# Patient Record
Sex: FEMALE | Race: WHITE | NOT HISPANIC OR LATINO | Employment: OTHER | URBAN - METROPOLITAN AREA
[De-identification: names, ages, dates, MRNs, and addresses within clinical notes are randomized per-mention and may not be internally consistent; named-entity substitution may affect disease eponyms.]

---

## 2020-07-28 NOTE — PROGRESS NOTES
Subjective:   8/5/2020  9:41 AM  Primary care physician:No primary care provider on file.  Referring Provider: Jay King MD  Medical Oncologist: Jay King MD  Other: Krunal Robison MD    Chief Complaint:   Chief Complaint   Patient presents with   • New Patient     NP LIVER CA REF DR KING     Diagnosis:   1. Hepatocellular carcinoma (HCC)     2. Abnormal CT of the abdomen     3. Abdominal pain, unspecified abdominal location     4. Viral hepatitis A without hepatic coma         History of presenting illness:  Precious Bullock  is a pleasant 70 y.o. year old female who presented with what appears to be cirrhosis of unknown origin with a hepatoma in possibly segment 2 or 4A.  The patient has significant cirrhosis of the liver with an enlarged spleen.  She states that she was not a drinker nor does she have hepatitis C or B.  She does have a polycythemia vera and is being followed by Dr. King.  During a work-up for she was found to have cirrhosis and tumor in that region.  There is a questionable seeing subcentimeter lesion in segment 4A as well.  In any event, the patient was then referred in to see me.  Her liver function tests are not that bad and her INR is normal.  She appears to be a child's class a cirrhotic.  I do not have a platelet count recently but she denies any upper or lower GI bleeds.  She has been scheduled for endoscopy for possible varices over the next couple weeks.  She has an appointment to see Dr. King this Friday.  The patient denies being jaundiced.  She denies any weight loss or increase in abdominal girth.  Her CT scan from an outside facility dated 2/25/2020 reveals that there is no ascites and there is a approximately 2 cm mass in the region as described above.  Following the left portal pedicle it does not reach the area of this tumor so it may overlap between segment 2 and 4A.  The patient does have splenomegaly.  She does have some varices, but I suspect based on liver function tests  that her platelet count will be adequate.  She is here with her  to discuss next steps.  I have personally reviewed the imaging studies as well as all the notes from Dr. Matias.      No past medical history on file.  No past surgical history on file.  Allergies   Allergen Reactions   • Pcn [Penicillins] Swelling and Myalgia   • Sulfa Drugs Anaphylaxis and Swelling     Outpatient Encounter Medications as of 2020   Medication Sig Dispense Refill   • amLODIPine (NORVASC) 10 MG Tab      • omeprazole (PRILOSEC) 40 MG delayed-release capsule      • SM ASPIRIN ADULT LOW STRENGTH 81 MG EC tablet        No facility-administered encounter medications on file as of 2020.      Social History     Socioeconomic History   • Marital status: Not on file     Spouse name: Not on file   • Number of children: Not on file   • Years of education: Not on file   • Highest education level: Not on file   Occupational History   • Not on file   Social Needs   • Financial resource strain: Not on file   • Food insecurity     Worry: Not on file     Inability: Not on file   • Transportation needs     Medical: Not on file     Non-medical: Not on file   Tobacco Use   • Smoking status: Former Smoker     Years: 20.00     Quit date: 2000     Years since quittin.0   • Smokeless tobacco: Never Used   Substance and Sexual Activity   • Alcohol use: Never     Frequency: Never   • Drug use: Never   • Sexual activity: Not on file   Lifestyle   • Physical activity     Days per week: Not on file     Minutes per session: Not on file   • Stress: Not on file   Relationships   • Social connections     Talks on phone: Not on file     Gets together: Not on file     Attends Spiritism service: Not on file     Active member of club or organization: Not on file     Attends meetings of clubs or organizations: Not on file     Relationship status: Not on file   • Intimate partner violence     Fear of current or ex partner: Not on file     Emotionally  "abused: Not on file     Physically abused: Not on file     Forced sexual activity: Not on file   Other Topics Concern   • Not on file   Social History Narrative   • Not on file      Social History     Tobacco Use   Smoking Status Former Smoker   • Years: 20.00   • Quit date: 2000   • Years since quittin.0   Smokeless Tobacco Never Used     Social History     Substance and Sexual Activity   Alcohol Use Never   • Frequency: Never     Social History     Substance and Sexual Activity   Drug Use Never        No family history on file.  No pertinent family history on file    Review of Systems   Constitutional: Positive for malaise/fatigue.   HENT: Positive for sinus pain.    Genitourinary: Positive for frequency and urgency.   Musculoskeletal: Positive for back pain.   Endo/Heme/Allergies: Positive for environmental allergies.   All other systems reviewed and are negative.       Objective:   /78 (BP Location: Right arm, Patient Position: Sitting, BP Cuff Size: Adult)   Pulse 74   Temp 36.7 °C (98.1 °F) (Temporal)   Ht 1.702 m (5' 7\")   Wt 70.8 kg (156 lb)   SpO2 96%   BMI 24.43 kg/m²     Physical Exam    Labs: 20                Imagin20 CT [facility]  Per my read,         Pathology:  NA    Procedures:  NA    Diagnosis:     1. Hepatocellular carcinoma (HCC)     2. Abnormal CT of the abdomen     3. Abdominal pain, unspecified abdominal location     4. Viral hepatitis A without hepatic coma             Medical Decision Making:  Today's Assessment / Status / Plan:     In light of the present findings, I feel the patient would be a good candidate for a laparoscopic microwave ablation of this lesion.  We will also surveyed the rest of the liver for any other tumors.  We discussed the risks and benefits of the procedure including bleeding, infection, not being able to find the tumor, intraoperative bleeding necessitating an open procedure, liver failure being less than 1%.  She also understands " she will need a COVID test, and we will also draw a CBC, CMP, and INR the day of the procedure to confirm that it is safe to proceed.  She also understands that she could get a recurrent tumor in the area of the ablation but also in another location due to her cirrhosis.  She has agreed the above plan and this is been scheduled for Monday, August 10, 2020.    I, Dr. Robert have entered, reviewed and confirmed the above diagnoses related to this patient on this date of service, 8/5/2020  9:41 AM.    She agreed and verbalized her agreement and understanding with the current plan. I answered all questions and concerns she has at this time and advised her to call at any time in the interim with questions or concerns in regards to her care.    Thank you for allowing me to participate in her care, I will continue to follow closely.       Please note that this dictation was created using voice recognition software. I have made every reasonable attempt to correct obvious errors, but I expect that there are errors of grammar and possibly content that I did not discover before finalizing the note.     Thank you for this consultation and allowing me to participate in your patient's care. If I can be of further service please contact my office.

## 2020-08-05 ENCOUNTER — HOSPITAL ENCOUNTER (OUTPATIENT)
Dept: LAB | Facility: MEDICAL CENTER | Age: 70
End: 2020-08-05
Attending: SURGERY
Payer: MEDICARE

## 2020-08-05 ENCOUNTER — OFFICE VISIT (OUTPATIENT)
Dept: SURGICAL ONCOLOGY | Facility: MEDICAL CENTER | Age: 70
End: 2020-08-05
Payer: MEDICARE

## 2020-08-05 ENCOUNTER — HOSPITAL ENCOUNTER (OUTPATIENT)
Dept: RADIOLOGY | Facility: MEDICAL CENTER | Age: 70
End: 2020-08-05
Payer: MEDICARE

## 2020-08-05 VITALS
HEART RATE: 74 BPM | WEIGHT: 156 LBS | TEMPERATURE: 98.1 F | BODY MASS INDEX: 24.48 KG/M2 | OXYGEN SATURATION: 96 % | DIASTOLIC BLOOD PRESSURE: 78 MMHG | HEIGHT: 67 IN | SYSTOLIC BLOOD PRESSURE: 144 MMHG

## 2020-08-05 DIAGNOSIS — R10.9 ABDOMINAL PAIN, UNSPECIFIED ABDOMINAL LOCATION: ICD-10-CM

## 2020-08-05 DIAGNOSIS — B15.9 VIRAL HEPATITIS A WITHOUT HEPATIC COMA: ICD-10-CM

## 2020-08-05 DIAGNOSIS — R93.5 ABNORMAL CT OF THE ABDOMEN: ICD-10-CM

## 2020-08-05 DIAGNOSIS — C22.0 HEPATOCELLULAR CARCINOMA (HCC): ICD-10-CM

## 2020-08-05 DIAGNOSIS — D45 POLYCYTHEMIA VERA (HCC): ICD-10-CM

## 2020-08-05 LAB — COVID ORDER STATUS COVID19: NORMAL

## 2020-08-05 PROCEDURE — U0003 INFECTIOUS AGENT DETECTION BY NUCLEIC ACID (DNA OR RNA); SEVERE ACUTE RESPIRATORY SYNDROME CORONAVIRUS 2 (SARS-COV-2) (CORONAVIRUS DISEASE [COVID-19]), AMPLIFIED PROBE TECHNIQUE, MAKING USE OF HIGH THROUGHPUT TECHNOLOGIES AS DESCRIBED BY CMS-2020-01-R: HCPCS

## 2020-08-05 PROCEDURE — 99205 OFFICE O/P NEW HI 60 MIN: CPT | Performed by: SURGERY

## 2020-08-05 PROCEDURE — C9803 HOPD COVID-19 SPEC COLLECT: HCPCS

## 2020-08-05 RX ORDER — AMLODIPINE BESYLATE 10 MG/1
10 TABLET ORAL
COMMUNITY
Start: 2020-08-03

## 2020-08-05 RX ORDER — OMEPRAZOLE 40 MG/1
40 CAPSULE, DELAYED RELEASE ORAL
COMMUNITY
Start: 2020-08-03

## 2020-08-05 RX ORDER — ASPIRIN 81 MG/1
81 TABLET, DELAYED RELEASE ORAL
COMMUNITY
Start: 2020-07-15

## 2020-08-05 SDOH — HEALTH STABILITY: MENTAL HEALTH: HOW OFTEN DO YOU HAVE A DRINK CONTAINING ALCOHOL?: NEVER

## 2020-08-05 ASSESSMENT — ENCOUNTER SYMPTOMS
SINUS PAIN: 1
BACK PAIN: 1

## 2020-08-06 LAB
SARS-COV-2 RNA RESP QL NAA+PROBE: NOTDETECTED
SPECIMEN SOURCE: NORMAL

## 2020-08-07 ENCOUNTER — APPOINTMENT (OUTPATIENT)
Dept: ADMISSIONS | Facility: MEDICAL CENTER | Age: 70
End: 2020-08-07
Payer: MEDICARE

## 2020-08-07 SDOH — HEALTH STABILITY: MENTAL HEALTH: HOW OFTEN DO YOU HAVE A DRINK CONTAINING ALCOHOL?: NOT ASKED

## 2020-08-08 NOTE — PROGRESS NOTES
COVID-19 Pre-surgery screenin. Do you have an undiagnosed respiratory illness or symptoms such as coughing or sneezing? (No)  a. Onset of Sx  b. Acute vs. chronic respiratory illness      2. Do you have an unexplained fever greater than 100.4 degrees Fahrenheit or 38 degrees Celsius?                     (No)     3. Have you had direct exposure to a patient who tested positive for Covid-19?                          (No)     4. Have you traveled outside [facility] in the last 14 days? (No)     5. Have you had any loss of your sense of taste or smell? Have you had N/V or sore throat? (No)     Patient has been informed of visitor policy and asked to wear a mask upon entering the hospital   (Yes)

## 2020-08-10 ENCOUNTER — ANESTHESIA EVENT (OUTPATIENT)
Dept: SURGERY | Facility: MEDICAL CENTER | Age: 70
End: 2020-08-10
Payer: MEDICARE

## 2020-08-10 ENCOUNTER — ANESTHESIA (OUTPATIENT)
Dept: SURGERY | Facility: MEDICAL CENTER | Age: 70
End: 2020-08-10
Payer: MEDICARE

## 2020-08-10 ENCOUNTER — HOSPITAL ENCOUNTER (OUTPATIENT)
Facility: MEDICAL CENTER | Age: 70
End: 2020-08-10
Attending: SURGERY | Admitting: SURGERY
Payer: MEDICARE

## 2020-08-10 VITALS
OXYGEN SATURATION: 95 % | SYSTOLIC BLOOD PRESSURE: 131 MMHG | RESPIRATION RATE: 16 BRPM | HEIGHT: 67 IN | WEIGHT: 155.42 LBS | DIASTOLIC BLOOD PRESSURE: 52 MMHG | TEMPERATURE: 97.8 F | HEART RATE: 98 BPM | BODY MASS INDEX: 24.39 KG/M2

## 2020-08-10 DIAGNOSIS — K74.60 CIRRHOSIS OF LIVER WITHOUT ASCITES, UNSPECIFIED HEPATIC CIRRHOSIS TYPE (HCC): ICD-10-CM

## 2020-08-10 DIAGNOSIS — D73.2 CONGESTIVE SPLENOMEGALY: ICD-10-CM

## 2020-08-10 DIAGNOSIS — C22.0 HEPATOCELLULAR CARCINOMA (HCC): ICD-10-CM

## 2020-08-10 LAB
ABO GROUP BLD: NORMAL
ALBUMIN SERPL BCP-MCNC: 4.2 G/DL (ref 3.2–4.9)
ALBUMIN/GLOB SERPL: 1.4 G/DL
ALP SERPL-CCNC: 140 U/L (ref 30–99)
ALT SERPL-CCNC: 21 U/L (ref 2–50)
ANION GAP SERPL CALC-SCNC: 14 MMOL/L (ref 7–16)
AST SERPL-CCNC: 40 U/L (ref 12–45)
BASOPHILS # BLD AUTO: 0.5 % (ref 0–1.8)
BASOPHILS # BLD: 0.03 K/UL (ref 0–0.12)
BILIRUB SERPL-MCNC: 1.4 MG/DL (ref 0.1–1.5)
BLD GP AB SCN SERPL QL: NORMAL
BUN SERPL-MCNC: 14 MG/DL (ref 8–22)
CALCIUM SERPL-MCNC: 9.2 MG/DL (ref 8.5–10.5)
CHLORIDE SERPL-SCNC: 106 MMOL/L (ref 96–112)
CO2 SERPL-SCNC: 22 MMOL/L (ref 20–33)
CREAT SERPL-MCNC: 0.48 MG/DL (ref 0.5–1.4)
EKG IMPRESSION: NORMAL
EOSINOPHIL # BLD AUTO: 0.28 K/UL (ref 0–0.51)
EOSINOPHIL NFR BLD: 4.6 % (ref 0–6.9)
ERYTHROCYTE [DISTWIDTH] IN BLOOD BY AUTOMATED COUNT: 46.4 FL (ref 35.9–50)
GLOBULIN SER CALC-MCNC: 3 G/DL (ref 1.9–3.5)
GLUCOSE SERPL-MCNC: 101 MG/DL (ref 65–99)
HCT VFR BLD AUTO: 43.4 % (ref 37–47)
HGB BLD-MCNC: 14.8 G/DL (ref 12–16)
IMM GRANULOCYTES # BLD AUTO: 0.01 K/UL (ref 0–0.11)
IMM GRANULOCYTES NFR BLD AUTO: 0.2 % (ref 0–0.9)
INR PPP: 1.17 (ref 0.87–1.13)
LYMPHOCYTES # BLD AUTO: 2.13 K/UL (ref 1–4.8)
LYMPHOCYTES NFR BLD: 35 % (ref 22–41)
MCH RBC QN AUTO: 31.5 PG (ref 27–33)
MCHC RBC AUTO-ENTMCNC: 34.1 G/DL (ref 33.6–35)
MCV RBC AUTO: 92.3 FL (ref 81.4–97.8)
MONOCYTES # BLD AUTO: 0.43 K/UL (ref 0–0.85)
MONOCYTES NFR BLD AUTO: 7.1 % (ref 0–13.4)
NEUTROPHILS # BLD AUTO: 3.2 K/UL (ref 2–7.15)
NEUTROPHILS NFR BLD: 52.6 % (ref 44–72)
NRBC # BLD AUTO: 0 K/UL
NRBC BLD-RTO: 0 /100 WBC
PLATELET # BLD AUTO: 378 K/UL (ref 164–446)
PMV BLD AUTO: 10.6 FL (ref 9–12.9)
POTASSIUM SERPL-SCNC: 3.9 MMOL/L (ref 3.6–5.5)
PROT SERPL-MCNC: 7.2 G/DL (ref 6–8.2)
PROTHROMBIN TIME: 15.3 SEC (ref 12–14.6)
RBC # BLD AUTO: 4.7 M/UL (ref 4.2–5.4)
RH BLD: NORMAL
SODIUM SERPL-SCNC: 142 MMOL/L (ref 135–145)
WBC # BLD AUTO: 6.1 K/UL (ref 4.8–10.8)

## 2020-08-10 PROCEDURE — 160025 RECOVERY II MINUTES (STATS): Performed by: SURGERY

## 2020-08-10 PROCEDURE — 160029 HCHG SURGERY MINUTES - 1ST 30 MINS LEVEL 4: Performed by: SURGERY

## 2020-08-10 PROCEDURE — 160046 HCHG PACU - 1ST 60 MINS PHASE II: Performed by: SURGERY

## 2020-08-10 PROCEDURE — 501570 HCHG TROCAR, SEPARATOR: Performed by: SURGERY

## 2020-08-10 PROCEDURE — A9270 NON-COVERED ITEM OR SERVICE: HCPCS | Performed by: ANESTHESIOLOGY

## 2020-08-10 PROCEDURE — 86900 BLOOD TYPING SEROLOGIC ABO: CPT

## 2020-08-10 PROCEDURE — 501445 HCHG STAPLER, SKIN DISP: Performed by: SURGERY

## 2020-08-10 PROCEDURE — 93010 ELECTROCARDIOGRAM REPORT: CPT | Performed by: INTERNAL MEDICINE

## 2020-08-10 PROCEDURE — A9270 NON-COVERED ITEM OR SERVICE: HCPCS | Performed by: SURGERY

## 2020-08-10 PROCEDURE — 502571 HCHG PACK, LAP CHOLE: Performed by: SURGERY

## 2020-08-10 PROCEDURE — 86901 BLOOD TYPING SEROLOGIC RH(D): CPT

## 2020-08-10 PROCEDURE — 85610 PROTHROMBIN TIME: CPT

## 2020-08-10 PROCEDURE — 93005 ELECTROCARDIOGRAM TRACING: CPT | Performed by: SURGERY

## 2020-08-10 PROCEDURE — 700111 HCHG RX REV CODE 636 W/ 250 OVERRIDE (IP): Performed by: ANESTHESIOLOGY

## 2020-08-10 PROCEDURE — 700102 HCHG RX REV CODE 250 W/ 637 OVERRIDE(OP): Performed by: SURGERY

## 2020-08-10 PROCEDURE — 700105 HCHG RX REV CODE 258: Performed by: SURGERY

## 2020-08-10 PROCEDURE — 700101 HCHG RX REV CODE 250: Performed by: ANESTHESIOLOGY

## 2020-08-10 PROCEDURE — 501838 HCHG SUTURE GENERAL: Performed by: SURGERY

## 2020-08-10 PROCEDURE — 501571 HCHG TROCAR, SEPARATOR 12X100: Performed by: SURGERY

## 2020-08-10 PROCEDURE — 160002 HCHG RECOVERY MINUTES (STAT): Performed by: SURGERY

## 2020-08-10 PROCEDURE — 76940 US GUIDE TISSUE ABLATION: CPT | Mod: 26 | Performed by: SURGERY

## 2020-08-10 PROCEDURE — 47370 LAPARO ABLATE LIVER TUMOR RF: CPT | Performed by: SURGERY

## 2020-08-10 PROCEDURE — 700102 HCHG RX REV CODE 250 W/ 637 OVERRIDE(OP): Performed by: ANESTHESIOLOGY

## 2020-08-10 PROCEDURE — 85025 COMPLETE CBC W/AUTO DIFF WBC: CPT

## 2020-08-10 PROCEDURE — 160048 HCHG OR STATISTICAL LEVEL 1-5: Performed by: SURGERY

## 2020-08-10 PROCEDURE — 160041 HCHG SURGERY MINUTES - EA ADDL 1 MIN LEVEL 4: Performed by: SURGERY

## 2020-08-10 PROCEDURE — 160009 HCHG ANES TIME/MIN: Performed by: SURGERY

## 2020-08-10 PROCEDURE — 86850 RBC ANTIBODY SCREEN: CPT

## 2020-08-10 PROCEDURE — 700101 HCHG RX REV CODE 250: Performed by: SURGERY

## 2020-08-10 PROCEDURE — 80053 COMPREHEN METABOLIC PANEL: CPT

## 2020-08-10 PROCEDURE — 160035 HCHG PACU - 1ST 60 MINS PHASE I: Performed by: SURGERY

## 2020-08-10 RX ORDER — HALOPERIDOL 5 MG/ML
1 INJECTION INTRAMUSCULAR
Status: DISCONTINUED | OUTPATIENT
Start: 2020-08-10 | End: 2020-08-10 | Stop reason: HOSPADM

## 2020-08-10 RX ORDER — ONDANSETRON 2 MG/ML
INJECTION INTRAMUSCULAR; INTRAVENOUS PRN
Status: DISCONTINUED | OUTPATIENT
Start: 2020-08-10 | End: 2020-08-10 | Stop reason: SURG

## 2020-08-10 RX ORDER — TRAMADOL HYDROCHLORIDE 50 MG/1
50 TABLET ORAL EVERY 4 HOURS PRN
Qty: 30 TAB | Refills: 0 | Status: SHIPPED | OUTPATIENT
Start: 2020-08-10 | End: 2020-08-17

## 2020-08-10 RX ORDER — ONDANSETRON 2 MG/ML
4 INJECTION INTRAMUSCULAR; INTRAVENOUS
Status: DISCONTINUED | OUTPATIENT
Start: 2020-08-10 | End: 2020-08-10 | Stop reason: HOSPADM

## 2020-08-10 RX ORDER — SODIUM CHLORIDE, SODIUM LACTATE, POTASSIUM CHLORIDE, CALCIUM CHLORIDE 600; 310; 30; 20 MG/100ML; MG/100ML; MG/100ML; MG/100ML
INJECTION, SOLUTION INTRAVENOUS CONTINUOUS
Status: DISCONTINUED | OUTPATIENT
Start: 2020-08-10 | End: 2020-08-10 | Stop reason: HOSPADM

## 2020-08-10 RX ORDER — PROMETHAZINE HYDROCHLORIDE 25 MG/1
12.5 TABLET ORAL EVERY 6 HOURS PRN
Qty: 30 TAB | Refills: 0 | Status: SHIPPED | OUTPATIENT
Start: 2020-08-10

## 2020-08-10 RX ORDER — BUPIVACAINE HYDROCHLORIDE AND EPINEPHRINE 5; 5 MG/ML; UG/ML
INJECTION, SOLUTION PERINEURAL
Status: DISCONTINUED | OUTPATIENT
Start: 2020-08-10 | End: 2020-08-10 | Stop reason: HOSPADM

## 2020-08-10 RX ORDER — METOCLOPRAMIDE HYDROCHLORIDE 5 MG/ML
INJECTION INTRAMUSCULAR; INTRAVENOUS PRN
Status: DISCONTINUED | OUTPATIENT
Start: 2020-08-10 | End: 2020-08-10 | Stop reason: SURG

## 2020-08-10 RX ORDER — CEFAZOLIN SODIUM 1 G/3ML
INJECTION, POWDER, FOR SOLUTION INTRAMUSCULAR; INTRAVENOUS PRN
Status: DISCONTINUED | OUTPATIENT
Start: 2020-08-10 | End: 2020-08-10 | Stop reason: SURG

## 2020-08-10 RX ADMIN — ONDANSETRON 4 MG: 2 INJECTION INTRAMUSCULAR; INTRAVENOUS at 07:48

## 2020-08-10 RX ADMIN — SODIUM CHLORIDE, POTASSIUM CHLORIDE, SODIUM LACTATE AND CALCIUM CHLORIDE: 600; 310; 30; 20 INJECTION, SOLUTION INTRAVENOUS at 06:59

## 2020-08-10 RX ADMIN — METOCLOPRAMIDE 10 MG: 5 INJECTION, SOLUTION INTRAMUSCULAR; INTRAVENOUS at 07:48

## 2020-08-10 RX ADMIN — SODIUM CHLORIDE, POTASSIUM CHLORIDE, SODIUM LACTATE AND CALCIUM CHLORIDE: 600; 310; 30; 20 INJECTION, SOLUTION INTRAVENOUS at 06:00

## 2020-08-10 RX ADMIN — EPHEDRINE SULFATE 10 MG: 50 INJECTION INTRAMUSCULAR; INTRAVENOUS; SUBCUTANEOUS at 07:25

## 2020-08-10 RX ADMIN — PROPOFOL 150 MG: 10 INJECTION, EMULSION INTRAVENOUS at 07:03

## 2020-08-10 RX ADMIN — POVIDONE-IODINE 15 ML: 10 SOLUTION TOPICAL at 05:59

## 2020-08-10 RX ADMIN — CEFAZOLIN 1 G: 330 INJECTION, POWDER, FOR SOLUTION INTRAMUSCULAR; INTRAVENOUS at 07:03

## 2020-08-10 RX ADMIN — SUGAMMADEX 200 MG: 100 INJECTION, SOLUTION INTRAVENOUS at 07:48

## 2020-08-10 RX ADMIN — LIDOCAINE HYDROCHLORIDE 0.5 ML: 10 INJECTION, SOLUTION EPIDURAL; INFILTRATION; INTRACAUDAL at 05:59

## 2020-08-10 RX ADMIN — FENTANYL CITRATE 25 MCG: 50 INJECTION INTRAMUSCULAR; INTRAVENOUS at 08:03

## 2020-08-10 RX ADMIN — EPHEDRINE SULFATE 10 MG: 50 INJECTION INTRAMUSCULAR; INTRAVENOUS; SUBCUTANEOUS at 07:27

## 2020-08-10 RX ADMIN — HYDROCODONE BITARTRATE AND ACETAMINOPHEN 15 ML: 7.5; 325 SOLUTION ORAL at 07:58

## 2020-08-10 RX ADMIN — ROCURONIUM BROMIDE 50 MG: 10 INJECTION, SOLUTION INTRAVENOUS at 07:03

## 2020-08-10 NOTE — ANESTHESIA POSTPROCEDURE EVALUATION
Patient: Precious Bullock    Procedure Summary     Date: 08/10/20 Room / Location: [place] [address] / SURGERY [facility]    Anesthesia Start: 0659 Anesthesia Stop: 0755    Procedure: ABLATION, LESION, LIVER, LAPAROSCOPIC- SEGMENT 2/8 LIVER LESION (N/A Abdomen) Diagnosis: (LIVER CA,CHIRROSIS)    Surgeon: Robinson Robert M.D. Responsible Provider: Jalil Feliciano M.D.    Anesthesia Type: general ASA Status: 3          Final Anesthesia Type: general  Last vitals  BP   Blood Pressure : 131/52    Temp   36.6 °C (97.8 °F)    Pulse   Pulse: 98   Resp   16    SpO2   95 %      Anesthesia Post Evaluation    Patient location during evaluation: PACU  Patient participation: complete - patient participated  Level of consciousness: awake and alert    Airway patency: patent  Anesthetic complications: no  Cardiovascular status: hemodynamically stable  Respiratory status: acceptable  Hydration status: euvolemic    PONV: none           Nurse Pain Score: 2 (NPRS)

## 2020-08-10 NOTE — ANESTHESIA QCDR
2019 [facility] Clinical Data Registry (for Quality Improvement)     Postoperative nausea/vomiting risk protocol (Adult = 18 yrs and Pediatric 3-17 yrs)- (430 and 463)  General inhalation anesthetic (NOT TIVA) with PONV risk factors: Yes  Provision of anti-emetic therapy with at least 2 different classes of agents: Yes   Patient DID NOT receive anti-emetic therapy and reason is documented in Medical Record:  N/A    Multimodal Pain Management- (477)  Non-emergent surgery AND patient age >= 18: No  Use of Multimodal Pain Management, two or more drugs and/or interventions, NOT including systemic opioids:   Exception: Documented allergy to multiple classes of analgesics:     Smoking Abstinence (404)  Patient is current smoker (cigarette, pipe, e-cig, marijuanna): No  Elective Surgery:   Abstinence instructions provided prior to day of surgery:   Patient abstained from smoking on day of surgery:     Pre-Op Beta-Blocker in Isolated CABG (44)  Isolated CABG AND patient age >= 18: No  Beta-blocker admin within 24 hours of surgical incision:   Exception:of medical reason(s) for not administering beta blocker within 24 hours prior to surgical incision (e.g., not  indicated,other medical reason):     PACU assessment of acute postoperative pain prior to Anesthesia Care End- Applies to Patients Age = 18- (ABG7)  Initial PACU pain score is which of the following: < 7/10  Patient unable to report pain score: N/A    Post-anesthetic transfer of care checklist/protocol to PACU/ICU- (426 and 427)  Upon conclusion of case, patient transferred to which of the following locations: PACU/Non-ICU  Use of transfer checklist/protocol: Yes  Exclusion: Service Performed in Patient Hospital Room (and thus did not require transfer): N/A  Unplanned admission to ICU related to anesthesia service up through end of PACU care- (MD51)  Unplanned admission to ICU (not initially anticipated at anesthesia start time): No

## 2020-08-10 NOTE — ANESTHESIA TIME REPORT
Anesthesia Start and Stop Event Times     Date Time Event    8/10/2020 0658 Ready for Procedure     0659 Anesthesia Start     0755 Anesthesia Stop        Responsible Staff  08/10/20    Name Role Begin End    Jalil Feliciano M.D. Anesth 0659 0755        Preop Diagnosis (Free Text):  Pre-op Diagnosis     LIVER CA        Preop Diagnosis (Codes):    Post op Diagnosis  Liver cancer (HCC)      Premium Reason  Non-Premium    Comments:

## 2020-08-10 NOTE — OP REPORT
DATE OF SERVICE:  08/10/2020    REFERRING PHYSICIAN:  Kana Matias MD    PRIMARY CARE PHYSICIAN:  Juan Manuel Savage MD    INDICATIONS:  The patient is a 70-year-old female patient known to me, who was   referred in to see me after having being diagnosed with cirrhosis, who was   also found to have slight portal hypertension and possible multifocal HCC.    There was a moderate-sized lesion in segment 4A/2 as well as 2 smaller lesions   somewhere in the left and right lobe.  In any event, after a long discussion,   the patient elected to proceed with a laparoscopic ablation.    SURGEON:  Robinson Robert MD    ASSISTANT SURGEON:  None.    ANESTHESIOLOGIST:  Jalil Feliciano MD    ANESTHESIA:  General and local anesthetic.    ESTIMATED BLOOD LOSS:  5 mL.    COMPLICATIONS:  None.    FINDINGS:  Patient was found to have a large tumor measuring approximately 2   cm consistent with the largest one seen on imaging in the area of 4B close to   segment 2 and there was another smaller satellite 1+ cm lesion in segment 2,   which was adjacent to the pericardium, which was not amenable to ablation and   there was a third small satellite near segment 4A went up near the dome, which   was not reachable with ablation.    PROCEDURES DONE:  1. Laparoscopic microwave thermal ablation of segment 4A hepatoma measuring 2   cm at 140 tejeda for 2 minutes giving us ablation cavity of 3.2x4.0.  2. Laparoscopic microwave thermal ablation of the same lesion in segment 4A at   140 tejeda for 2 minutes giving us overlapping 3.2x4.0 cm ablation cavity.    CASE CLASS:  Clean.    PREOPERATIVE DIAGNOSES:  1. Portal hypertension.  2. Cirrhosis of unknown etiology.  3. Hepatoma.    POSTOPERATIVE DIAGNOSES:  1. Portal hypertension.  2. Cirrhosis of unknown etiology.  3. Hepatoma.    DESCRIPTION OF PROCEDURE:  The patient was brought to OR, placed under general   anesthetic with both arms out, prepped with chlorhexidine prep on the   anterior  abdominal wall, covered with sterile drapes, given preoperative   antibiotics.  Timeout was done, which was adequate.  At that point, she was   given 5 mL of 0.5% Marcaine with epinephrine.  Infraumbilical curvilinear   incision was made with 11 blade.  Between 2 Kochers and Aimee, the abdomen was   opened under direct vision atraumatically.  An 0 Vicryl suture was placed on   each side of the fascia.  Brett trocar was inserted.  Abdomen was insufflated   to 15 mm of pressure.  She was placed in reverse Trendelenburg, exposing both   upper quadrants.  A 5 mm port was initially placed in the left anterior   axillary line percutaneously under direct vision atraumatically after giving   local anesthetic.  We then did a survey of segment 2 and did identify a small   1 cm lesion may be 1.5 cm lesion up near the origin of the left hepatic vein,   but it was extremely close to the pericardium.  We could not get a safe   distance from the pericardium or separation from the diaphragm in order to do   a safe ablation.  The rest of the segment 2 and 3 were clean.    We then moved our 5 mm port percutaneously under direct vision atraumatically   into the right anterior axillary line.  Then, we inspected the segment 4A and   did identify the 2 cm lesion that was identified on imaging and then there was   a satellite lesion more medially and cephalad near the hepatic veins of the   middle and left near the cava and again we did not feel we had a safe angle to   do an ablation, so we proceeded with just ablating the larger tumor.  At that   point, we placed on the subcostal margin, a small stab wound after giving   local anesthetic and placed the 29 cm laparoscopic ablation needle.  Under   ultrasound guidance, we did get to the more superior aspect of the tumor and   did 140 tejeda at 2 minutes giving us a 3.2x4.0 cm ablation cavity.  We then   lowered the needle, approximately 1 cm externally and then came into the same   angle  and felt as though we were near the bottom end of the tumor and then did   a similar ablation.  Once we had completed our ablation, there was no   bleeding.  We reinspected with ultrasound and could not identify that tumor   anymore in segment 4A.    So at that point, the operation was completed.  We desufflated the abdomen and   placed her in supine position, waited 5 minutes by the clock, reinflated and   inspected.  There was no bleeding or bile leak.  At that point, the operation   was completed.  Photos were taken for the patient and her family.  The   preplaced 0 Vicryl suture in the umbilicus were tied.  She was given a total   of 30 mL of 0.5% Marcaine with epinephrine throughout the area of the 3 port   sites and the stab wound.  Staples were used on the skin, 2x2, Tegaderms.    Extubated and transferred to recovery room.             ____________________________________     MD MARIA D Milian / HOLLY    DD:  08/10/2020 08:08:12  DT:  08/10/2020 08:42:25    D#:  [id number]  Job#:  [id number]

## 2020-08-10 NOTE — ANESTHESIA PROCEDURE NOTES
Airway    Date/Time: 8/10/2020 7:03 AM  Performed by: Jalil Feliciano M.D.  Authorized by: Jalil Feliciano M.D.     Location:  OR  Urgency:  Elective  Indications for Airway Management:  Anesthesia      Spontaneous Ventilation: absent    Sedation Level:  Deep  Preoxygenated: Yes    Patient Position:  Sniffing  Final Airway Type:  Endotracheal airway  Final Endotracheal Airway:  ETT  Cuffed: Yes    Technique Used for Successful ETT Placement:  Direct laryngoscopy    Insertion Site:  Oral  Blade Type:  Smalls  Laryngoscope Blade/Videolaryngoscope Blade Size:  2  ETT Size (mm):  7.0  Measured from:  Teeth  ETT to Teeth (cm):  22  Placement Verified by: auscultation and capnometry    Cormack-Lehane Classification:  Grade I - full view of glottis  Number of Attempts at Approach:  1

## 2020-08-10 NOTE — ANESTHESIA PREPROCEDURE EVALUATION
Relevant Problems   No relevant active problems   P Vera  HTN  Liver CA    Physical Exam    Airway   Mallampati: II  TM distance: >3 FB  Neck ROM: full       Cardiovascular - normal exam  Rhythm: regular  Rate: normal  (-) murmur     Dental - normal exam           Pulmonary - normal exam  Breath sounds clear to auscultation     Abdominal    Neurological - normal exam                 Anesthesia Plan    ASA 3   ASA physical status 3 criteria: other (comment)    Plan - general       Airway plan will be ETT        Induction: intravenous    Postoperative Plan: Postoperative administration of opioids is intended.    Pertinent diagnostic labs and testing reviewed    Informed Consent:    Anesthetic plan and risks discussed with patient.    Use of blood products discussed with: patient whom consented to blood products.

## 2020-08-10 NOTE — DISCHARGE INSTRUCTIONS
ACTIVITY: Rest and take it easy for the first 24 hours.  A responsible adult is recommended to remain with you during that time.  It is normal to feel sleepy.  We encourage you to not do anything that requires balance, judgment or coordination.    MILD FLU-LIKE SYMPTOMS ARE NORMAL. YOU MAY EXPERIENCE GENERALIZED MUSCLE ACHES, THROAT IRRITATION, HEADACHE AND/OR SOME NAUSEA.    FOR 24 HOURS DO NOT:  Drive, operate machinery or run household appliances.  Drink beer or alcoholic beverages.   Make important decisions or sign legal documents.    SPECIAL INSTRUCTIONS:     OK to shower in morning with dressings on   Remove dressing in 6 days   Follow-up with office MA for staple removal after 10-14 days   Follow up with Dr. Robert after completing 1 month  CT of liver imaging    DIET: To avoid nausea, slowly advance diet as tolerated, avoiding spicy or greasy foods for the first day.  Add more substantial food to your diet according to your physician's instructions.  Babies can be fed formula or breast milk as soon as they are hungry.  INCREASE FLUIDS AND FIBER TO AVOID CONSTIPATION.    SURGICAL DRESSING/BATHING: See above.    FOLLOW-UP APPOINTMENT:  A follow-up appointment should be arranged with your doctor in 1-2 weeks; call to schedule.    You should CALL YOUR PHYSICIAN if you develop:  Fever greater than 101 degrees F.  Pain not relieved by medication, or persistent nausea or vomiting.  Excessive bleeding (blood soaking through dressing) or unexpected drainage from the wound.  Extreme redness or swelling around the incision site, drainage of pus or foul smelling drainage.  Inability to urinate or empty your bladder within 8 hours.  Problems with breathing or chest pain.    You should call 911 if you develop problems with breathing or chest pain.  If you are unable to contact your doctor or surgical center, you should go to the nearest emergency room or urgent care center.  Physician's telephone #:  [phone]    If any questions arise, call your doctor.  If your doctor is not available, please feel free to call the Surgical Center at [phone].  The Center is open Monday through Friday from 7AM to 7PM.  You can also call the HEALTH HOTLINE open 24 hours/day, 7 days/week and speak to a nurse at [phone], or toll free at [phone].    A registered nurse may call you a few days after your surgery to see how you are doing after your procedure.    MEDICATIONS: Resume taking daily medication.  Take prescribed pain medication with food.  If no medication is prescribed, you may take non-aspirin pain medication if needed.  PAIN MEDICATION CAN BE VERY CONSTIPATING.  Take a stool softener or laxative such as senokot, pericolace, or milk of magnesia if needed.    Prescription given for Phenergan (Nausea) and Ultram (Pain).  Last pain medication given at 8:00am (Can have next pain medication at 12:00pm)    If your physician has prescribed pain medication that includes Acetaminophen (Tylenol), do not take additional Acetaminophen (Tylenol) while taking the prescribed medication.    Depression / Suicide Risk    As you are discharged from this [facility] facility, it is important to learn how to keep safe from harming yourself.    Recognize the warning signs:  · Abrupt changes in personality, positive or negative- including increase in energy   · Giving away possessions  · Change in eating patterns- significant weight changes-  positive or negative  · Change in sleeping patterns- unable to sleep or sleeping all the time   · Unwillingness or inability to communicate  · Depression  · Unusual sadness, discouragement and loneliness  · Talk of wanting to die  · Neglect of personal appearance   · Rebelliousness- reckless behavior  · Withdrawal from people/activities they love  · Confusion- inability to concentrate     If you or a loved one observes any of these behaviors or has concerns about self-harm, here's  what you can do:  · Talk about it- your feelings and reasons for harming yourself  · Remove any means that you might use to hurt yourself (examples: pills, rope, extension cords, firearm)  · Get professional help from the community (Mental Health, Substance Abuse, psychological counseling)  · Do not be alone:Call your Safe Contact- someone whom you trust who will be there for you.  · Call your local CRISIS HOTLINE 160-6156 or 815-955-8123  · Call your local Children's Mobile Crisis Response Team [place] (363) 099-0168 or www.[employer]  · Call the toll free National Suicide Prevention Hotlines   · National Suicide Prevention Lifeline 840-607-QOSD (2659)  · National Hope Line Network 800-SUICIDE (463-4246)

## 2020-09-03 NOTE — PROGRESS NOTES
Subjective:   9/8/2020  1:01 PM  Primary care physician:Juan Manuel Savage M.D.  Referring Provider:Jay Matias MD  Medical Oncologist: Jay Matias MD  Other: Krunal Robison MD    Chief Complaint:   Chief Complaint   Patient presents with   • Follow-Up     PO ABLATION/CT LIVER CA      Diagnosis:   1. Hepatocellular carcinoma (HCC)     2. Cirrhosis of liver without ascites, unspecified hepatic cirrhosis type (HCC)         History of presenting illness:  Precious Bullock  is a pleasant 70 y.o. year old female who presented with status post ablation of 2 lesions were attempted but only one was able to be ablated.  Based on the imaging, the lesions in segment 8 appear to be ablated completely.  The second lesion in segment 4A was not able to be ablated because of its approximation to 1 of the hepatic vein branches.  Patient is here postop.  She denies any fever or chills, nausea or vomiting.  I personally reviewed the patient's CT scan from today and it looks very good.  She will be sent to interventional radiology for chemoembolization.      Past Medical History:   Diagnosis Date   • Arthritis     osteoarthritis    • Cancer (HCC)     liver    • Dental disorder     dentures    • Heart burn    • Hypertension    • Polycythemia vera (HCC) 07/01/2020    treatment is to draw 500cc of blood. pt states they peterson 500 cc of blood twice in July and next blood draw appointment is for 8/11.    • Retinal vein occlusion 1999    left, blind in left eye    • Urinary bladder disorder     hx of UTIs    • Urinary incontinence      Past Surgical History:   Procedure Laterality Date   • HEPATIC ABLATION LAPAROSCOPIC N/A 8/10/2020    Procedure: ABLATION, LESION, LIVER, LAPAROSCOPIC- SEGMENT 2/8 LIVER LESION;  Surgeon: Robinson Robert M.D.;  Location: SURGERY [facility];  Service: General   • HYSTERECTOMY LAPAROSCOPY  2013   • TUBAL LIGATION  1973   • TONSILLECTOMY       Allergies   Allergen Reactions   • Pcn [Penicillins]  "Swelling and Myalgia   • Sulfa Drugs Anaphylaxis and Swelling   • Levaquin      \"very Dizzy \"     Outpatient Encounter Medications as of 2020   Medication Sig Dispense Refill   • promethazine (PHENERGAN) 25 MG Tab Take 0.5 Tabs by mouth every 6 hours as needed. 30 Tab 0   • CALCIUM PO Take 1 Tab by mouth every bedtime. Pt unsure of dose     • Sennosides ([employer] SENNA PO) Take 1 Tab by mouth as needed.     • amLODIPine (NORVASC) 10 MG Tab Take 10 mg by mouth every bedtime.     • omeprazole (PRILOSEC) 40 MG delayed-release capsule Take 40 mg by mouth every bedtime.     • SM ASPIRIN ADULT LOW STRENGTH 81 MG EC tablet Take 81 mg by mouth every bedtime.       No facility-administered encounter medications on file as of 2020.      Social History     Socioeconomic History   • Marital status:      Spouse name: Not on file   • Number of children: Not on file   • Years of education: Not on file   • Highest education level: Not on file   Occupational History   • Not on file   Social Needs   • Financial resource strain: Not on file   • Food insecurity     Worry: Not on file     Inability: Not on file   • Transportation needs     Medical: Not on file     Non-medical: Not on file   Tobacco Use   • Smoking status: Former Smoker     Packs/day: 1.00     Years: 20.00     Pack years: 20.00     Types: Cigarettes     Quit date: 2000     Years since quittin.1   • Smokeless tobacco: Never Used   Substance and Sexual Activity   • Alcohol use: Not Currently     Comment: rarely   • Drug use: Never   • Sexual activity: Not on file   Lifestyle   • Physical activity     Days per week: Not on file     Minutes per session: Not on file   • Stress: Not on file   Relationships   • Social connections     Talks on phone: Not on file     Gets together: Not on file     Attends Jewish service: Not on file     Active member of club or organization: Not on file     Attends meetings of clubs or organizations: Not on file     " "Relationship status: Not on file   • Intimate partner violence     Fear of current or ex partner: Not on file     Emotionally abused: Not on file     Physically abused: Not on file     Forced sexual activity: Not on file   Other Topics Concern   • Not on file   Social History Narrative   • Not on file      Social History     Tobacco Use   Smoking Status Former Smoker   • Packs/day: 1.00   • Years: 20.00   • Pack years: 20.00   • Types: Cigarettes   • Quit date: 2000   • Years since quittin.1   Smokeless Tobacco Never Used     Social History     Substance and Sexual Activity   Alcohol Use Not Currently    Comment: rarely     Social History     Substance and Sexual Activity   Drug Use Never        No family history on file.  No pertinent family history on file    Review of Systems   Genitourinary: Positive for frequency.   Musculoskeletal:        Muscle weakness   All other systems reviewed and are negative.       Objective:   /60 (BP Location: Left arm, Patient Position: Sitting, BP Cuff Size: Adult)   Pulse 79   Temp 36.7 °C (98 °F) (Temporal)   Ht 1.702 m (5' 7\")   Wt 69.9 kg (154 lb)   SpO2 94%   BMI 24.12 kg/m²     Physical Exam   Pulmonary/Chest: Effort normal and breath sounds normal.   Abdominal: Soft. Bowel sounds are normal.   Incisions are clean, dry, and intact   Skin: Skin is warm and dry.       Labs:  Results for ROSENDO GUZMAN (MRN [id number]) as of 9/3/2020 13:42   Ref. Range 8/10/2020 05:50   WBC Latest Ref Range: 4.8 - 10.8 K/uL 6.1   RBC Latest Ref Range: 4.20 - 5.40 M/uL 4.70   Hemoglobin Latest Ref Range: 12.0 - 16.0 g/dL 14.8   Hematocrit Latest Ref Range: 37.0 - 47.0 % 43.4   MCV Latest Ref Range: 81.4 - 97.8 fL 92.3   MCH Latest Ref Range: 27.0 - 33.0 pg 31.5   MCHC Latest Ref Range: 33.6 - 35.0 g/dL 34.1   RDW Latest Ref Range: 35.9 - 50.0 fL 46.4   Platelet Count Latest Ref Range: 164 - 446 K/uL 378   MPV Latest Ref Range: 9.0 - 12.9 fL 10.6   Neutrophils-Polys Latest " Ref Range: 44.00 - 72.00 % 52.60   Neutrophils (Absolute) Latest Ref Range: 2.00 - 7.15 K/uL 3.20   Lymphocytes Latest Ref Range: 22.00 - 41.00 % 35.00   Lymphs (Absolute) Latest Ref Range: 1.00 - 4.80 K/uL 2.13   Monocytes Latest Ref Range: 0.00 - 13.40 % 7.10   Monos (Absolute) Latest Ref Range: 0.00 - 0.85 K/uL 0.43   Eosinophils Latest Ref Range: 0.00 - 6.90 % 4.60   Eos (Absolute) Latest Ref Range: 0.00 - 0.51 K/uL 0.28   Basophils Latest Ref Range: 0.00 - 1.80 % 0.50   Baso (Absolute) Latest Ref Range: 0.00 - 0.12 K/uL 0.03   Immature Granulocytes Latest Ref Range: 0.00 - 0.90 % 0.20   Immature Granulocytes (abs) Latest Ref Range: 0.00 - 0.11 K/uL 0.01   Nucleated RBC Latest Units: /100 WBC 0.00   NRBC (Absolute) Latest Units: K/uL 0.00   Sodium Latest Ref Range: 135 - 145 mmol/L 142   Potassium Latest Ref Range: 3.6 - 5.5 mmol/L 3.9   Chloride Latest Ref Range: 96 - 112 mmol/L 106   Co2 Latest Ref Range: 20 - 33 mmol/L 22   Anion Gap Latest Ref Range: 7.0 - 16.0  14.0   Glucose Latest Ref Range: 65 - 99 mg/dL 101 (H)   Bun Latest Ref Range: 8 - 22 mg/dL 14   Creatinine Latest Ref Range: 0.50 - 1.40 mg/dL 0.48 (L)   GFR If  Latest Ref Range: >60 mL/min/1.73 m 2 >60   GFR If Non  Latest Ref Range: >60 mL/min/1.73 m 2 >60   Calcium Latest Ref Range: 8.5 - 10.5 mg/dL 9.2   AST(SGOT) Latest Ref Range: 12 - 45 U/L 40   ALT(SGPT) Latest Ref Range: 2 - 50 U/L 21   Alkaline Phosphatase Latest Ref Range: 30 - 99 U/L 140 (H)   Total Bilirubin Latest Ref Range: 0.1 - 1.5 mg/dL 1.4   Albumin Latest Ref Range: 3.2 - 4.9 g/dL 4.2   Total Protein Latest Ref Range: 6.0 - 8.2 g/dL 7.2   Globulin Latest Ref Range: 1.9 - 3.5 g/dL 3.0   A-G Ratio Latest Units: g/dL 1.4   INR Latest Ref Range: 0.87 - 1.13  1.17 (H)   PT Latest Ref Range: 12.0 - 14.6 sec 15.3 (H)   ABO Grouping Only Unknown O   Rh Grouping Only Unknown POS   Antibody Screen-Cod Unknown NEG       Imagin20 CT  Per my read,      IMPRESSION:     1.  Ablation cavity within hepatic segment 8 measuring 3.5 x 2.6 cm without evidence for residual tumor     2.  Arterial phase enhancing mass within hepatic segment 4A measuring 2.3 x 1.6 cm washout and no development of pseudocapsule-LIRAD code 5     3.  Arterial phase enhancing 14 x 10 mm mass within hepatic segment 8 just posterior to ablation cavity with washout and no development of pseudocapsule-LIRAD code 4 due to size less than 20 mm     4.  Cirrhosis with portal hypertension          Pathology:  NA    Procedures: 8/10/20    1. Laparoscopic microwave thermal ablation of segment 4A hepatoma measuring 2   cm at 140 tejeda for 2 minutes giving us ablation cavity of 3.2x4.0.  2. Laparoscopic microwave thermal ablation of the same lesion in segment 4A at   140 tejeda for 2 minutes giving us overlapping 3.2x4.0 cm ablation cavity.    Diagnosis:     1. Hepatocellular carcinoma (HCC)     2. Cirrhosis of liver without ascites, unspecified hepatic cirrhosis type (HCC)             Medical Decision Making:  Today's Assessment / Status / Plan:     In light of the present findings, the patient will be sent to interventional radiology for a chemoembolization of the segment for a lesion that we are unable to reach because its approximation to the hepatic vein.  Following this, we will repeat imaging to see how well.    I, Dr. Robert have entered, reviewed and confirmed the above diagnoses related to this patient on this date of service, 9/8/2020  1:01 PM.    She agreed and verbalized her agreement and understanding with the current plan. I answered all questions and concerns she has at this time and advised her to call at any time in the interim with questions or concerns in regards to her care.    Thank you for allowing me to participate in her care, I will continue to follow closely.       Please note that this dictation was created using voice recognition software. I have made every reasonable  attempt to correct obvious errors, but I expect that there are errors of grammar and possibly content that I did not discover before finalizing the note.     Thank you for this consultation and allowing me to participate in your patient's care. If I can be of further service please contact my office.

## 2020-09-08 ENCOUNTER — OFFICE VISIT (OUTPATIENT)
Dept: SURGICAL ONCOLOGY | Facility: MEDICAL CENTER | Age: 70
End: 2020-09-08
Payer: MEDICARE

## 2020-09-08 ENCOUNTER — HOSPITAL ENCOUNTER (OUTPATIENT)
Dept: RADIOLOGY | Facility: MEDICAL CENTER | Age: 70
End: 2020-09-08
Attending: SURGERY
Payer: MEDICARE

## 2020-09-08 VITALS
TEMPERATURE: 98 F | BODY MASS INDEX: 24.17 KG/M2 | HEART RATE: 79 BPM | SYSTOLIC BLOOD PRESSURE: 118 MMHG | OXYGEN SATURATION: 94 % | DIASTOLIC BLOOD PRESSURE: 60 MMHG | HEIGHT: 67 IN | WEIGHT: 154 LBS

## 2020-09-08 DIAGNOSIS — K74.60 CIRRHOSIS OF LIVER WITHOUT ASCITES, UNSPECIFIED HEPATIC CIRRHOSIS TYPE (HCC): ICD-10-CM

## 2020-09-08 DIAGNOSIS — D73.2 CONGESTIVE SPLENOMEGALY: ICD-10-CM

## 2020-09-08 DIAGNOSIS — C22.0 HEPATOCELLULAR CARCINOMA (HCC): ICD-10-CM

## 2020-09-08 PROCEDURE — 700117 HCHG RX CONTRAST REV CODE 255: Performed by: SURGERY

## 2020-09-08 PROCEDURE — 99024 POSTOP FOLLOW-UP VISIT: CPT | Performed by: SURGERY

## 2020-09-08 PROCEDURE — 74160 CT ABDOMEN W/CONTRAST: CPT

## 2020-09-08 RX ADMIN — IOHEXOL 50 ML: 240 INJECTION, SOLUTION INTRATHECAL; INTRAVASCULAR; INTRAVENOUS; ORAL at 09:50

## 2020-09-08 RX ADMIN — IOHEXOL 100 ML: 350 INJECTION, SOLUTION INTRAVENOUS at 09:50

## 2020-09-08 ASSESSMENT — FIBROSIS 4 INDEX: FIB4 SCORE: 1.64

## 2020-09-16 NOTE — CONSULTS
Interventional Oncology Consultation      Re: Precious Bullock     MRN: [id number]   : 1950    Precious Bullock was referred by Robinson Robert MD. She is a 70 y.o. female seen in clinic for evaluation and possible intervention of unresectable hepatocellular carcinoma. She is also under the care of Jay Matias MD, Krunal Robison PA-C, and Juan Manuel Savage MD.    History of Present Illness:   presented with polycythemia vera and was found to have cirrhosis and an incidental liver mass. She underwent the following intervention:  · 8/10/20 Laparoscopic ablation of segment 4A hepatoma, [facility])    There is a second LR 5 lesion in segment 4A that was not able to be ablated due to location, and a small LR 4 lesion also noted in segment 8.  has been referred to interventional radiology for evaluation and management with locoregional therapy. Today, she states she has recovered from her ablation procedure. She denies recent illness and fever. She denies problems with anesthesia. She denies chest pain and dyspnea. She has not yet started her treatment for polycythemia vera in anticipation of side effects that could impact her next procedure. [place] usually [place] in [place] and has some upcoming travel plans. She lives in [place] and is accompanied by her  today.     She is seen today for review of imaging studies and discussion of possible intervention with image-guided ablation or drug eluting bead chemoembolization.     Past Medical History:   Diagnosis Date   • Arthritis     osteoarthritis    • Cancer (HCC)     liver    • Dental disorder     dentures    • Heart burn    • Hypertension    • Polycythemia vera (HCC) 2020    treatment is to draw 500cc of blood. pt states they peterson 500 cc of blood twice in July and next blood draw appointment is for .    • Retinal vein occlusion     left, blind in left eye    • Urinary bladder disorder     hx of UTIs    • Urinary  incontinence      Past Surgical History:   Procedure Laterality Date   • HEPATIC ABLATION LAPAROSCOPIC N/A 8/10/2020    Procedure: ABLATION, LESION, LIVER, LAPAROSCOPIC- SEGMENT 2/8 LIVER LESION;  Surgeon: Robinson Robert M.D.;  Location: SURGERY [facility];  Service: General   • HYSTERECTOMY LAPAROSCOPY     • TUBAL LIGATION  1973   • TONSILLECTOMY       [facility]  Procedures: 8/10/20   1. Laparoscopic microwave thermal ablation of segment 4A hepatoma measuring 2   cm at 140 tejeda for 2 minutes giving us ablation cavity of 3.2x4.0.  2. Laparoscopic microwave thermal ablation of the same lesion in segment 4A at   140 tejeda for 2 minutes giving us overlapping 3.2x4.0 cm ablation cavity.      Social History     Socioeconomic History   • Marital status:      Spouse name: Not on file   • Number of children: Not on file   • Years of education: Not on file   • Highest education level: Not on file   Occupational History   • Not on file   Social Needs   • Financial resource strain: Not on file   • Food insecurity     Worry: Not on file     Inability: Not on file   • Transportation needs     Medical: Not on file     Non-medical: Not on file   Tobacco Use   • Smoking status: Former Smoker     Packs/day: 1.00     Years: 20.00     Pack years: 20.00     Types: Cigarettes     Quit date: 2000     Years since quittin.1   • Smokeless tobacco: Never Used   Substance and Sexual Activity   • Alcohol use: Not Currently     Comment: rarely   • Drug use: Never   • Sexual activity: Not on file   Lifestyle   • Physical activity     Days per week: Not on file     Minutes per session: Not on file   • Stress: Not on file   Relationships   • Social connections     Talks on phone: Not on file     Gets together: Not on file     Attends Church service: Not on file     Active member of club or organization: Not on file     Attends meetings of clubs or organizations: Not on file     Relationship status:  Not on file   • Intimate partner violence     Fear of current or ex partner: Not on file     Emotionally abused: Not on file     Physically abused: Not on file     Forced sexual activity: Not on file   Other Topics Concern   • Not on file   Social History Narrative   • Not on file     No family history on file.    Review of Systems   Constitutional: Negative.  Negative for chills, diaphoresis, fever, malaise/fatigue and weight loss.   Respiratory: Negative.    Cardiovascular: Negative.    Gastrointestinal: Negative.    Skin: Negative.    Neurological: Negative for sensory change, speech change, focal weakness and weakness.       A comprehensive 14-point review of systems was negative except as described above.     Labs:      Ref. Range 8/5/2020 13:28 8/10/2020 05:50   WBC Latest Ref Range: 4.8 - 10.8 K/uL  6.1   RBC Latest Ref Range: 4.20 - 5.40 M/uL  4.70   Hemoglobin Latest Ref Range: 12.0 - 16.0 g/dL  14.8   Hematocrit Latest Ref Range: 37.0 - 47.0 %  43.4   MCV Latest Ref Range: 81.4 - 97.8 fL  92.3   MCH Latest Ref Range: 27.0 - 33.0 pg  31.5   MCHC Latest Ref Range: 33.6 - 35.0 g/dL  34.1   RDW Latest Ref Range: 35.9 - 50.0 fL  46.4   Platelet Count Latest Ref Range: 164 - 446 K/uL  378   MPV Latest Ref Range: 9.0 - 12.9 fL  10.6   Neutrophils-Polys Latest Ref Range: 44.00 - 72.00 %  52.60   Neutrophils (Absolute) Latest Ref Range: 2.00 - 7.15 K/uL  3.20   Lymphocytes Latest Ref Range: 22.00 - 41.00 %  35.00   Lymphs (Absolute) Latest Ref Range: 1.00 - 4.80 K/uL  2.13   Monocytes Latest Ref Range: 0.00 - 13.40 %  7.10   Monos (Absolute) Latest Ref Range: 0.00 - 0.85 K/uL  0.43   Eosinophils Latest Ref Range: 0.00 - 6.90 %  4.60   Eos (Absolute) Latest Ref Range: 0.00 - 0.51 K/uL  0.28   Basophils Latest Ref Range: 0.00 - 1.80 %  0.50   Baso (Absolute) Latest Ref Range: 0.00 - 0.12 K/uL  0.03   Immature Granulocytes Latest Ref Range: 0.00 - 0.90 %  0.20   Immature Granulocytes (abs) Latest Ref Range: 0.00 -  0.11 K/uL  0.01   Nucleated RBC Latest Units: /100 WBC  0.00   NRBC (Absolute) Latest Units: K/uL  0.00   Sodium Latest Ref Range: 135 - 145 mmol/L  142   Potassium Latest Ref Range: 3.6 - 5.5 mmol/L  3.9   Chloride Latest Ref Range: 96 - 112 mmol/L  106   Co2 Latest Ref Range: 20 - 33 mmol/L  22   Anion Gap Latest Ref Range: 7.0 - 16.0   14.0   Glucose Latest Ref Range: 65 - 99 mg/dL  101 (H)   Bun Latest Ref Range: 8 - 22 mg/dL  14   Creatinine Latest Ref Range: 0.50 - 1.40 mg/dL  0.48 (L)   GFR If  Latest Ref Range: >60 mL/min/1.73 m 2  >60   GFR If Non  Latest Ref Range: >60 mL/min/1.73 m 2  >60   Calcium Latest Ref Range: 8.5 - 10.5 mg/dL  9.2   AST(SGOT) Latest Ref Range: 12 - 45 U/L  40   ALT(SGPT) Latest Ref Range: 2 - 50 U/L  21   Alkaline Phosphatase Latest Ref Range: 30 - 99 U/L  140 (H)   Total Bilirubin Latest Ref Range: 0.1 - 1.5 mg/dL  1.4   Albumin Latest Ref Range: 3.2 - 4.9 g/dL  4.2   Total Protein Latest Ref Range: 6.0 - 8.2 g/dL  7.2   Globulin Latest Ref Range: 1.9 - 3.5 g/dL  3.0   A-G Ratio Latest Units: g/dL  1.4   INR Latest Ref Range: 0.87 - 1.13   1.17 (H)   PT Latest Ref Range: 12.0 - 14.6 sec  15.3 (H)   COVID Order Status Unknown Received    SARS-CoV-2 by PCR Unknown NotDetected    SARS-CoV-2 Source Unknown Nasal Swab    ABO Grouping Only Unknown  O   Rh Grouping Only Unknown  POS   Antibody Screen-Cod Unknown  NEG     Child Robles Class A  DAYANNA Grade A1  NLR 1.51    Pathology:  none    Radiology:   CT abdomen on September 8, 2020 at [facility]:  1.  Ablation cavity within hepatic segment 8 measuring 3.5 x 2.6 cm without evidence for residual tumor  2.  Arterial phase enhancing mass within hepatic segment 4A measuring 2.3 x 1.6 cm washout and no development of pseudocapsule-LIRAD code 5  3.  Arterial phase enhancing 14 x 10 mm mass within hepatic segment 8 just posterior to ablation cavity with washout and no development of pseudocapsule-LIRAD code 4 due to  "size less than 20 mm  4.  Cirrhosis with portal hypertension      CT Abdomen June 25, 2020 at [facility]:    Current Outpatient Medications   Medication Sig Dispense Refill   • promethazine (PHENERGAN) 25 MG Tab Take 0.5 Tabs by mouth every 6 hours as needed. 30 Tab 0   • CALCIUM PO Take 1 Tab by mouth every bedtime. Pt unsure of dose     • Sennosides ([employer] SENNA PO) Take 1 Tab by mouth as needed.     • amLODIPine (NORVASC) 10 MG Tab Take 10 mg by mouth every bedtime.     • omeprazole (PRILOSEC) 40 MG delayed-release capsule Take 40 mg by mouth every bedtime.     • SM ASPIRIN ADULT LOW STRENGTH 81 MG EC tablet Take 81 mg by mouth every bedtime.       No current facility-administered medications for this encounter.        Allergies   Allergen Reactions   • Pcn [Penicillins] Swelling and Myalgia   • Sulfa Drugs Anaphylaxis and Swelling   • Levaquin      \"very Dizzy \"       Physical Exam   Constitutional: She is oriented to person, place, and time and well-developed, well-nourished, and in no distress. No distress.   HENT:   Head: Normocephalic.   Eyes: No scleral icterus.   Pulmonary/Chest: Effort normal. No respiratory distress.   Abdominal: She exhibits no distension.   Neurological: She is alert and oriented to person, place, and time. She has normal sensation and normal strength. She is not agitated and not disoriented. She displays no weakness, no tremor, facial symmetry, normal stance and normal speech. No cranial nerve deficit. Gait normal. Coordination and gait normal.   Skin: Skin is warm and dry. No rash noted. She is not diaphoretic. No erythema. No pallor.   Psychiatric: Mood, memory, affect and judgment normal.     ECOG Performance Status 0    Impression:   1. Unresectable LR-5 lesion segment 4A, 2.3 x 1.6 cm amenable to image guided ablation.   2. Unresectable LR-4 lesion segment 8, 1.4 x 1.0 cm, amenable to image guided ablation.   3. Cirrhosis.  4. Splenomegaly.   5. Portal hypertension.    Plan: "   Reji Mendez MD has reviewed 's history and imaging studies, examined the patient, and discussed treatment options.  is a candidate for ablation of unresectable hepatocellular carcinoma. They are seen on CT and accessible. We explained that ablation would be favorable over JARVIS TACE at this time due to the potentially curative nature of the procedure versus a palliative TACE. We would be able to ablate both lesions in the same setting. We discussed the method of the ablation as well as the expected clinical course with the possibility of post-procedure nausea and pain and hospitalization. We discussed the possibility of tumor recurrence and development of future tumors.  We additionally discussed the risks, including bleeding and infection, damage to the arteries or adjacent tissue, reaction to any medications given during the procedure, potential side effects of contrast administration including renal damage,and  non-target embolization. There is a risk the procedure will not be effective or may need to be repeated. We discussed alternatives to the procedure including surveillance with no intervention and JARVIS TACE. The patient verbalizes understanding of risks, benefits, and alternatives to IR intervention and elects to proceed. All questions were answered. Written pre- and post- procedure care instructions were provided. We explained that the patient will need to have ongoing surveillance after the procedure, which will be managed by the treating team, and that future treatment depends on multiple factors including lab studies, imaging, and performance status. She is scheduled for September 22.        SINAI Michel with Reji Mendez MD  Interventional Radiology   [place] [id number] [facility] [id number])  [place] [id number]  [phone]

## 2020-09-17 ENCOUNTER — HOSPITAL ENCOUNTER (OUTPATIENT)
Dept: RADIOLOGY | Facility: MEDICAL CENTER | Age: 70
End: 2020-09-17
Attending: SURGERY
Payer: MEDICARE

## 2020-09-17 ENCOUNTER — PRE-ADMISSION TESTING (OUTPATIENT)
Dept: ADMISSIONS | Facility: MEDICAL CENTER | Age: 70
End: 2020-09-17
Attending: RADIOLOGY
Payer: MEDICARE

## 2020-09-17 DIAGNOSIS — D73.2 CONGESTIVE SPLENOMEGALY: ICD-10-CM

## 2020-09-17 DIAGNOSIS — K74.60 CIRRHOSIS OF LIVER WITHOUT ASCITES, UNSPECIFIED HEPATIC CIRRHOSIS TYPE (HCC): ICD-10-CM

## 2020-09-17 DIAGNOSIS — Z01.812 PRE-OPERATIVE LABORATORY EXAMINATION: ICD-10-CM

## 2020-09-17 DIAGNOSIS — C22.0 HEPATOCELLULAR CARCINOMA (HCC): ICD-10-CM

## 2020-09-17 LAB
COVID ORDER STATUS COVID19: NORMAL
SARS-COV-2 RNA RESP QL NAA+PROBE: NOTDETECTED
SPECIMEN SOURCE: NORMAL

## 2020-09-17 PROCEDURE — U0003 INFECTIOUS AGENT DETECTION BY NUCLEIC ACID (DNA OR RNA); SEVERE ACUTE RESPIRATORY SYNDROME CORONAVIRUS 2 (SARS-COV-2) (CORONAVIRUS DISEASE [COVID-19]), AMPLIFIED PROBE TECHNIQUE, MAKING USE OF HIGH THROUGHPUT TECHNOLOGIES AS DESCRIBED BY CMS-2020-01-R: HCPCS

## 2020-09-17 PROCEDURE — C9803 HOPD COVID-19 SPEC COLLECT: HCPCS

## 2020-09-17 ASSESSMENT — ENCOUNTER SYMPTOMS
SENSORY CHANGE: 0
WEAKNESS: 0
CONSTITUTIONAL NEGATIVE: 1
RESPIRATORY NEGATIVE: 1
FEVER: 0
DIAPHORESIS: 0
WEIGHT LOSS: 0
CHILLS: 0
GASTROINTESTINAL NEGATIVE: 1
FOCAL WEAKNESS: 0
SPEECH CHANGE: 0
CARDIOVASCULAR NEGATIVE: 1

## 2020-09-21 ENCOUNTER — APPOINTMENT (OUTPATIENT)
Dept: ADMISSIONS | Facility: MEDICAL CENTER | Age: 70
End: 2020-09-21
Payer: MEDICARE

## 2020-09-21 NOTE — OR NURSING
COVID-19 Pre-Surgery Screenin. Do you have an undiagnosed respiratory illness or symptoms such as coughing or sneezing? No    2. Do you have an unexplained fever greater than 100.4 degrees Fahrenheit or 38 degrees Celsius? No  ?  3. Have you had direct exposure to a patient who tested positive for Covid-19? No    4. Patient informed of current visitation and mask policies by this RN.

## 2020-09-22 ENCOUNTER — HOSPITAL ENCOUNTER (OUTPATIENT)
Facility: MEDICAL CENTER | Age: 70
End: 2020-09-23
Attending: RADIOLOGY | Admitting: RADIOLOGY
Payer: MEDICARE

## 2020-09-22 ENCOUNTER — ANESTHESIA (OUTPATIENT)
Dept: RADIOLOGY | Facility: MEDICAL CENTER | Age: 70
End: 2020-09-22
Payer: MEDICARE

## 2020-09-22 ENCOUNTER — ANESTHESIA EVENT (OUTPATIENT)
Dept: RADIOLOGY | Facility: MEDICAL CENTER | Age: 70
End: 2020-09-22
Payer: MEDICARE

## 2020-09-22 ENCOUNTER — APPOINTMENT (OUTPATIENT)
Dept: RADIOLOGY | Facility: MEDICAL CENTER | Age: 70
End: 2020-09-22
Attending: RADIOLOGY
Payer: MEDICARE

## 2020-09-22 DIAGNOSIS — C22.0 HEPATOMA (HCC): Primary | ICD-10-CM

## 2020-09-22 PROBLEM — I10 ESSENTIAL HYPERTENSION: Status: ACTIVE | Noted: 2020-09-22

## 2020-09-22 PROBLEM — R10.9 ABDOMINAL PAIN: Status: ACTIVE | Noted: 2020-09-22

## 2020-09-22 PROBLEM — D45 POLYCYTHEMIA VERA (HCC): Status: ACTIVE | Noted: 2020-09-22

## 2020-09-22 PROCEDURE — 700111 HCHG RX REV CODE 636 W/ 250 OVERRIDE (IP): Performed by: ANESTHESIOLOGY

## 2020-09-22 PROCEDURE — 700111 HCHG RX REV CODE 636 W/ 250 OVERRIDE (IP)

## 2020-09-22 PROCEDURE — 700101 HCHG RX REV CODE 250: Performed by: ANESTHESIOLOGY

## 2020-09-22 PROCEDURE — 160002 HCHG RECOVERY MINUTES (STAT)

## 2020-09-22 PROCEDURE — A9270 NON-COVERED ITEM OR SERVICE: HCPCS

## 2020-09-22 PROCEDURE — G0378 HOSPITAL OBSERVATION PER HR: HCPCS

## 2020-09-22 PROCEDURE — 99219 PR INITIAL OBSERVATION CARE,LEVL II: CPT | Performed by: HOSPITALIST

## 2020-09-22 PROCEDURE — 77012 CT SCAN FOR NEEDLE BIOPSY: CPT

## 2020-09-22 PROCEDURE — 700105 HCHG RX REV CODE 258: Performed by: RADIOLOGY

## 2020-09-22 PROCEDURE — 700102 HCHG RX REV CODE 250 W/ 637 OVERRIDE(OP)

## 2020-09-22 RX ORDER — OXYCODONE HCL 5 MG/5 ML
SOLUTION, ORAL ORAL
Status: COMPLETED
Start: 2020-09-22 | End: 2020-09-22

## 2020-09-22 RX ORDER — HYDROMORPHONE HYDROCHLORIDE 2 MG/ML
0.1 INJECTION, SOLUTION INTRAMUSCULAR; INTRAVENOUS; SUBCUTANEOUS
Status: DISCONTINUED | OUTPATIENT
Start: 2020-09-22 | End: 2020-09-22 | Stop reason: HOSPADM

## 2020-09-22 RX ORDER — HYDROMORPHONE HYDROCHLORIDE 2 MG/ML
0.2 INJECTION, SOLUTION INTRAMUSCULAR; INTRAVENOUS; SUBCUTANEOUS
Status: DISCONTINUED | OUTPATIENT
Start: 2020-09-22 | End: 2020-09-22 | Stop reason: HOSPADM

## 2020-09-22 RX ORDER — LIDOCAINE HYDROCHLORIDE 20 MG/ML
INJECTION, SOLUTION EPIDURAL; INFILTRATION; INTRACAUDAL; PERINEURAL PRN
Status: DISCONTINUED | OUTPATIENT
Start: 2020-09-22 | End: 2020-09-22 | Stop reason: SURG

## 2020-09-22 RX ORDER — HALOPERIDOL 5 MG/ML
1 INJECTION INTRAMUSCULAR
Status: DISCONTINUED | OUTPATIENT
Start: 2020-09-22 | End: 2020-09-22 | Stop reason: HOSPADM

## 2020-09-22 RX ORDER — OXYCODONE HYDROCHLORIDE 5 MG/1
5-10 TABLET ORAL EVERY 4 HOURS PRN
Status: DISCONTINUED | OUTPATIENT
Start: 2020-09-22 | End: 2020-09-23 | Stop reason: HOSPADM

## 2020-09-22 RX ORDER — OXYCODONE HCL 5 MG/5 ML
5 SOLUTION, ORAL ORAL
Status: COMPLETED | OUTPATIENT
Start: 2020-09-22 | End: 2020-09-22

## 2020-09-22 RX ORDER — METOPROLOL TARTRATE 1 MG/ML
1 INJECTION, SOLUTION INTRAVENOUS
Status: DISCONTINUED | OUTPATIENT
Start: 2020-09-22 | End: 2020-09-22 | Stop reason: HOSPADM

## 2020-09-22 RX ORDER — POLYETHYLENE GLYCOL 3350 17 G/17G
1 POWDER, FOR SOLUTION ORAL
Status: DISCONTINUED | OUTPATIENT
Start: 2020-09-22 | End: 2020-09-23 | Stop reason: HOSPADM

## 2020-09-22 RX ORDER — ONDANSETRON 2 MG/ML
INJECTION INTRAMUSCULAR; INTRAVENOUS
Status: COMPLETED
Start: 2020-09-22 | End: 2020-09-22

## 2020-09-22 RX ORDER — OXYCODONE HYDROCHLORIDE 5 MG/1
5 TABLET ORAL EVERY 4 HOURS PRN
Qty: 30 TAB | Refills: 0 | Status: SHIPPED | OUTPATIENT
Start: 2020-09-22 | End: 2020-09-29

## 2020-09-22 RX ORDER — OXYCODONE HCL 5 MG/5 ML
10 SOLUTION, ORAL ORAL
Status: COMPLETED | OUTPATIENT
Start: 2020-09-22 | End: 2020-09-22

## 2020-09-22 RX ORDER — DIPHENHYDRAMINE HYDROCHLORIDE 50 MG/ML
12.5 INJECTION INTRAMUSCULAR; INTRAVENOUS
Status: DISCONTINUED | OUTPATIENT
Start: 2020-09-22 | End: 2020-09-22 | Stop reason: HOSPADM

## 2020-09-22 RX ORDER — CEFAZOLIN SODIUM 1 G/3ML
INJECTION, POWDER, FOR SOLUTION INTRAMUSCULAR; INTRAVENOUS PRN
Status: DISCONTINUED | OUTPATIENT
Start: 2020-09-22 | End: 2020-09-22 | Stop reason: SURG

## 2020-09-22 RX ORDER — SODIUM CHLORIDE, SODIUM LACTATE, POTASSIUM CHLORIDE, CALCIUM CHLORIDE 600; 310; 30; 20 MG/100ML; MG/100ML; MG/100ML; MG/100ML
INJECTION, SOLUTION INTRAVENOUS CONTINUOUS
Status: ACTIVE | OUTPATIENT
Start: 2020-09-22 | End: 2020-09-23

## 2020-09-22 RX ORDER — ONDANSETRON 2 MG/ML
4 INJECTION INTRAMUSCULAR; INTRAVENOUS
Status: COMPLETED | OUTPATIENT
Start: 2020-09-22 | End: 2020-09-22

## 2020-09-22 RX ORDER — HYDROMORPHONE HYDROCHLORIDE 2 MG/ML
0.4 INJECTION, SOLUTION INTRAMUSCULAR; INTRAVENOUS; SUBCUTANEOUS
Status: DISCONTINUED | OUTPATIENT
Start: 2020-09-22 | End: 2020-09-22 | Stop reason: HOSPADM

## 2020-09-22 RX ORDER — OXYCODONE HYDROCHLORIDE 5 MG/1
5 TABLET ORAL
Status: DISCONTINUED | OUTPATIENT
Start: 2020-09-22 | End: 2020-09-23

## 2020-09-22 RX ORDER — ONDANSETRON 2 MG/ML
INJECTION INTRAMUSCULAR; INTRAVENOUS PRN
Status: DISCONTINUED | OUTPATIENT
Start: 2020-09-22 | End: 2020-09-22 | Stop reason: SURG

## 2020-09-22 RX ORDER — LABETALOL HYDROCHLORIDE 5 MG/ML
10 INJECTION, SOLUTION INTRAVENOUS ONCE
Status: DISCONTINUED | OUTPATIENT
Start: 2020-09-22 | End: 2020-09-22 | Stop reason: HOSPADM

## 2020-09-22 RX ORDER — MEPERIDINE HYDROCHLORIDE 25 MG/ML
6.25 INJECTION INTRAMUSCULAR; INTRAVENOUS; SUBCUTANEOUS
Status: DISCONTINUED | OUTPATIENT
Start: 2020-09-22 | End: 2020-09-22 | Stop reason: HOSPADM

## 2020-09-22 RX ORDER — AMOXICILLIN 250 MG
2 CAPSULE ORAL 2 TIMES DAILY
Status: DISCONTINUED | OUTPATIENT
Start: 2020-09-23 | End: 2020-09-23 | Stop reason: HOSPADM

## 2020-09-22 RX ORDER — MIDAZOLAM HYDROCHLORIDE 1 MG/ML
INJECTION INTRAMUSCULAR; INTRAVENOUS
Status: COMPLETED
Start: 2020-09-22 | End: 2020-09-22

## 2020-09-22 RX ORDER — ONDANSETRON 2 MG/ML
4 INJECTION INTRAMUSCULAR; INTRAVENOUS EVERY 4 HOURS PRN
Status: DISCONTINUED | OUTPATIENT
Start: 2020-09-22 | End: 2020-09-23 | Stop reason: HOSPADM

## 2020-09-22 RX ORDER — OXYCODONE HYDROCHLORIDE 10 MG/1
10 TABLET ORAL
Status: DISCONTINUED | OUTPATIENT
Start: 2020-09-22 | End: 2020-09-23

## 2020-09-22 RX ORDER — MORPHINE SULFATE 4 MG/ML
1-4 INJECTION, SOLUTION INTRAMUSCULAR; INTRAVENOUS
Status: DISCONTINUED | OUTPATIENT
Start: 2020-09-22 | End: 2020-09-23 | Stop reason: HOSPADM

## 2020-09-22 RX ORDER — BISACODYL 10 MG
10 SUPPOSITORY, RECTAL RECTAL
Status: DISCONTINUED | OUTPATIENT
Start: 2020-09-22 | End: 2020-09-23 | Stop reason: HOSPADM

## 2020-09-22 RX ORDER — ONDANSETRON 4 MG/1
4 TABLET, ORALLY DISINTEGRATING ORAL EVERY 4 HOURS PRN
Status: DISCONTINUED | OUTPATIENT
Start: 2020-09-22 | End: 2020-09-23 | Stop reason: HOSPADM

## 2020-09-22 RX ORDER — HYDROMORPHONE HYDROCHLORIDE 2 MG/ML
INJECTION, SOLUTION INTRAMUSCULAR; INTRAVENOUS; SUBCUTANEOUS
Status: COMPLETED
Start: 2020-09-22 | End: 2020-09-22

## 2020-09-22 RX ORDER — HYDROMORPHONE HYDROCHLORIDE 2 MG/ML
0.5 INJECTION, SOLUTION INTRAMUSCULAR; INTRAVENOUS; SUBCUTANEOUS
Status: DISCONTINUED | OUTPATIENT
Start: 2020-09-22 | End: 2020-09-23

## 2020-09-22 RX ORDER — ONDANSETRON 2 MG/ML
4 INJECTION INTRAMUSCULAR; INTRAVENOUS EVERY 8 HOURS PRN
Status: DISCONTINUED | OUTPATIENT
Start: 2020-09-22 | End: 2020-09-23

## 2020-09-22 RX ADMIN — EPHEDRINE SULFATE 10 MG: 50 INJECTION INTRAMUSCULAR; INTRAVENOUS; SUBCUTANEOUS at 15:55

## 2020-09-22 RX ADMIN — SUGAMMADEX 150 MG: 100 INJECTION, SOLUTION INTRAVENOUS at 16:07

## 2020-09-22 RX ADMIN — FENTANYL CITRATE 50 MCG: 50 INJECTION, SOLUTION INTRAMUSCULAR; INTRAVENOUS at 15:49

## 2020-09-22 RX ADMIN — HYDROMORPHONE HYDROCHLORIDE 0.25 MG: 2 INJECTION, SOLUTION INTRAMUSCULAR; INTRAVENOUS; SUBCUTANEOUS at 19:28

## 2020-09-22 RX ADMIN — FENTANYL CITRATE 50 MCG: 50 INJECTION, SOLUTION INTRAMUSCULAR; INTRAVENOUS at 14:52

## 2020-09-22 RX ADMIN — PROPOFOL 150 MG: 10 INJECTION, EMULSION INTRAVENOUS at 14:52

## 2020-09-22 RX ADMIN — LIDOCAINE HYDROCHLORIDE 20 MG: 20 INJECTION, SOLUTION EPIDURAL; INFILTRATION; INTRACAUDAL at 14:52

## 2020-09-22 RX ADMIN — POVIDONE IODINE 15 ML: 100 SOLUTION TOPICAL at 12:26

## 2020-09-22 RX ADMIN — EPHEDRINE SULFATE 5 MG: 50 INJECTION INTRAMUSCULAR; INTRAVENOUS; SUBCUTANEOUS at 15:05

## 2020-09-22 RX ADMIN — OXYCODONE HYDROCHLORIDE 5 MG: 5 SOLUTION ORAL at 17:44

## 2020-09-22 RX ADMIN — SODIUM CHLORIDE, POTASSIUM CHLORIDE, SODIUM LACTATE AND CALCIUM CHLORIDE: 600; 310; 30; 20 INJECTION, SOLUTION INTRAVENOUS at 13:00

## 2020-09-22 RX ADMIN — CEFAZOLIN 2 G: 330 INJECTION, POWDER, FOR SOLUTION INTRAMUSCULAR; INTRAVENOUS at 15:02

## 2020-09-22 RX ADMIN — MIDAZOLAM 2 MG: 1 INJECTION INTRAMUSCULAR; INTRAVENOUS at 14:46

## 2020-09-22 RX ADMIN — ROCURONIUM BROMIDE 30 MG: 10 INJECTION, SOLUTION INTRAVENOUS at 14:53

## 2020-09-22 RX ADMIN — ONDANSETRON 4 MG: 2 INJECTION INTRAMUSCULAR; INTRAVENOUS at 17:32

## 2020-09-22 RX ADMIN — Medication 5 MG: at 17:44

## 2020-09-22 RX ADMIN — ONDANSETRON 4 MG: 2 INJECTION INTRAMUSCULAR; INTRAVENOUS at 15:49

## 2020-09-22 RX ADMIN — ROCURONIUM BROMIDE 20 MG: 10 INJECTION, SOLUTION INTRAVENOUS at 15:30

## 2020-09-22 ASSESSMENT — LIFESTYLE VARIABLES
TOTAL SCORE: 0
ALCOHOL_USE: NO
HAVE YOU EVER FELT YOU SHOULD CUT DOWN ON YOUR DRINKING: NO
HAVE PEOPLE ANNOYED YOU BY CRITICIZING YOUR DRINKING: NO
TOTAL SCORE: 0
ON A TYPICAL DAY WHEN YOU DRINK ALCOHOL HOW MANY DRINKS DO YOU HAVE: 0
EVER HAD A DRINK FIRST THING IN THE MORNING TO STEADY YOUR NERVES TO GET RID OF A HANGOVER: NO
HOW MANY TIMES IN THE PAST YEAR HAVE YOU HAD 5 OR MORE DRINKS IN A DAY: 0
CONSUMPTION TOTAL: NEGATIVE
TOTAL SCORE: 0
DOES PATIENT WANT TO STOP DRINKING: NO
EVER FELT BAD OR GUILTY ABOUT YOUR DRINKING: NO
AVERAGE NUMBER OF DAYS PER WEEK YOU HAVE A DRINK CONTAINING ALCOHOL: 0

## 2020-09-22 ASSESSMENT — FIBROSIS 4 INDEX
FIB4 SCORE: 1.64
FIB4 SCORE: 1.64

## 2020-09-22 ASSESSMENT — PATIENT HEALTH QUESTIONNAIRE - PHQ9
2. FEELING DOWN, DEPRESSED, IRRITABLE, OR HOPELESS: NOT AT ALL
SUM OF ALL RESPONSES TO PHQ9 QUESTIONS 1 AND 2: 0
1. LITTLE INTEREST OR PLEASURE IN DOING THINGS: NOT AT ALL

## 2020-09-22 ASSESSMENT — ENCOUNTER SYMPTOMS
FEVER: 0
COUGH: 0
SHORTNESS OF BREATH: 0

## 2020-09-22 ASSESSMENT — COGNITIVE AND FUNCTIONAL STATUS - GENERAL
SUGGESTED CMS G CODE MODIFIER MOBILITY: CH
SUGGESTED CMS G CODE MODIFIER DAILY ACTIVITY: CH
MOBILITY SCORE: 24
DAILY ACTIVITIY SCORE: 24

## 2020-09-22 ASSESSMENT — PAIN DESCRIPTION - PAIN TYPE
TYPE: SURGICAL PAIN

## 2020-09-22 ASSESSMENT — PAIN SCALES - GENERAL: PAIN_LEVEL: 0

## 2020-09-22 NOTE — DISCHARGE INSTRUCTIONS
Discharge Instructions    Discharged to home by car with relative. Discharged via wheelchair, hospital escort: Yes.  Special equipment needed: Not Applicable    Be sure to schedule a follow-up appointment with your primary care doctor or any specialists as instructed.     Discharge Plan:   Diet Plan: Discussed  Activity Level: Discussed  Confirmed Follow up Appointment: Patient to Call and Schedule Appointment  Confirmed Symptoms Management: Discussed  Medication Reconciliation Updated: Yes  Influenza Vaccine Indication: Indicated: Not available from distributor/    I understand that a diet low in cholesterol, fat, and sodium is recommended for good health. Unless I have been given specific instructions below for another diet, I accept this instruction as my diet prescription.   Special Instructions: None    · Is patient discharged on Warfarin / Coumadin?   No     Depression / Suicide Risk    As you are discharged from this [facility] facility, it is important to learn how to keep safe from harming yourself.    Recognize the warning signs:  · Abrupt changes in personality, positive or negative- including increase in energy   · Giving away possessions  · Change in eating patterns- significant weight changes-  positive or negative  · Change in sleeping patterns- unable to sleep or sleeping all the time   · Unwillingness or inability to communicate  · Depression  · Unusual sadness, discouragement and loneliness  · Talk of wanting to die  · Neglect of personal appearance   · Rebelliousness- reckless behavior  · Withdrawal from people/activities they love  · Confusion- inability to concentrate     If you or a loved one observes any of these behaviors or has concerns about self-harm, here's what you can do:  · Talk about it- your feelings and reasons for harming yourself  · Remove any means that you might use to hurt yourself (examples: pills, rope, extension cords, firearm)  · Get professional help from  the community (Mental Health, Substance Abuse, psychological counseling)  · Do not be alone:Call your Safe Contact- someone whom you trust who will be there for you.  · Call your local CRISIS HOTLINE 487-0048 or 562-067-9675  · Call your local Children's Mobile Crisis Response Team [place] (102) 831-0403 or www.extraTKT  · Call the toll free National Suicide Prevention Hotlines   · National Suicide Prevention Lifeline 356-876-YQUH (2020)  · National Hope Line Network 800-SUICIDE (602-3567)      ACTIVITY: Rest and take it easy for the first 24 hours.  A responsible adult is recommended to remain with you during that time.  It is normal to feel sleepy.  We encourage you to not do anything that requires balance, judgment or coordination.    MILD FLU-LIKE SYMPTOMS ARE NORMAL. YOU MAY EXPERIENCE GENERALIZED MUSCLE ACHES, THROAT IRRITATION, HEADACHE AND/OR SOME NAUSEA.    FOR 24 HOURS DO NOT:  Drive, operate machinery or run household appliances.  Drink beer or alcoholic beverages.   Make important decisions or sign legal documents.    SPECIAL INSTRUCTIONS:       Radiofrequency Ablation of Liver Tumors  Radiofrequency ablation is a procedure to destroy specific cells using high-energy radio waves. During radiofrequency ablation of liver tumors, a needle-like probe is placed close to the liver tumor. The probe uses radio waves to produce heat that kills cancer cells. Months later, the dead tumor cells turn into harmless scar tissue. This procedure is usually used:  · For tumors:  ? That are small, usually less than 1½ in (3.8 cm).  ? That are in high-risk locations.  ? That have not shrunk with chemotherapy.  ? That have come back after surgical removal.  · In people whose medical condition makes surgery too dangerous.  · In people who have certain types of liver cancer and are on a waiting list for a liver transplant.  Tell a health care provider about:  · All allergies you have.  · All medicines you are  taking, including vitamins, herbs, eye drops, creams, and over-the-counter medicines.  · Any problems you or family members have had with anesthetic medicines.  · Any blood disorders you have.  · Any surgeries you have had.  · Any medical conditions you have.  · Whether you are pregnant or may be pregnant.  What are the risks?  Generally, this is a safe procedure. However, problems may occur, including:  · Infection.  · Bleeding.  · Flu-like symptoms, including fever and achiness.  · Pain.  · Damage to other structures or organs, such as a lung or the intestines.  What happens before the procedure?  Staying hydrated  Follow instructions from your health care provider about hydration, which may include:  · Up to 2 hours before the procedure - you may continue to drink clear liquids, such as water, clear fruit juice, black coffee, and plain tea.  Eating and drinking restrictions  Follow instructions from your health care provider about eating and drinking, which may include:  · 8 hours before the procedure - stop eating heavy meals or foods such as meat, fried foods, or fatty foods.  · 6 hours before the procedure - stop eating light meals or foods, such as toast or cereal.  · 6 hours before the procedure - stop drinking milk or drinks that contain milk.  · 2 hours before the procedure - stop drinking clear liquids.  General instructions  · Ask your health care provider about:  ? Changing or stopping your regular medicines. This is especially important if you are taking diabetes medicines or blood thinners.  ? Taking over-the-counter medicines, vitamins, herbs, and supplements.  ? Taking medicines such as aspirin and ibuprofen. These medicines can thin your blood. Do not take these medicines unless your health care provider tells you to take them.  · You may have blood tests done. These tests can help show how well your kidneys and liver are working. They can also show how well your blood clots.  · Plan to have  someone take you home from the hospital or clinic.  · Plan to have a responsible adult care for you for at least 24 hours after you leave the hospital or clinic. This is important.  What happens during the procedure?  · To lower your risk of infection:  ? Your health care team will wash or sanitize their hands.  ? Your skin will be washed with soap.  ? Hair may be removed from the surgical area.  · You will lie on an exam table. You will be connected to monitors that keep track of your heart rate, blood pressure, and breathing throughout the procedure.  · An IV will be inserted into one of your veins.  · You will be given one or more of the following:  ? A medicine to help you relax (sedative).  ? A medicine to numb the area where the probe will pass through the skin (local anesthetic).  ? A medicine to make you fall asleep (general anesthetic).  · Your health care provider will perform radiofrequency ablation. This will be done:  ? Through an incision. This type of ablation is called surgical radiofrequency ablation.  ? Through a tiny surgical incision, using a camera-like device to guide the probe to the liver tumor. This type of ablation is called laparoscopic radiofrequency ablation.  ? Using needle-sized electrodes that are passed through the skin directly into the area of the liver that is being treated. This type of ablation is called percutaneous radiofrequency ablation.  · Ultrasound or CT scans will be done to make sure the tip of the probe is in the right location.  · Once the probe has been positioned next to the tumor, radio waves will produce heat that kills the tumor cells. Depending on the size of the tumor, the probe may need to be repositioned several times.  · Once the tumor has been destroyed, the probe will be removed.  · A bandage (dressing) will be placed over the area where the probe was inserted.  · If an incision was made, it will be closed with stitches (sutures), skin glue, or adhesive  strips. A dressing will be placed over the area.  What happens after the procedure?  · Your blood pressure, heart rate, breathing rate, and blood oxygen level will be monitored until the medicines you were given have worn off.  · If you were given a sedative or a general anesthetic, you will be sleepy for the first few hours after the procedure.  · You may have some pain or nausea. This can usually be controlled with medicines.  · Do not drive for 24 hours if you were given a sedative.  · Depending on the type of procedure you had, you may need to stay in the hospital for up to a day or more. Ask your health care provider how long you will need to recover in the hospital.  Summary  · Radiofrequency ablation is a procedure to destroy specific cells using high-energy radio waves.  · During radiofrequency ablation of liver tumors, a needle-like probe is placed close to the liver tumor. The probe uses radio waves to produce heat that kills cancer cells.  · Plan to have someone take you home from the hospital or clinic.  · After the procedure, you may have some pain or nausea. This can usually be controlled with medicines.  This information is not intended to replace advice given to you by your health care provider. Make sure you discuss any questions you have with your health care provider.  Document Released: 05/06/2010 Document Revised: 11/30/2018 Document Reviewed: 03/08/2018  [employer] Patient Education © 2020 [employer]      DIET: To avoid nausea, slowly advance diet as tolerated, avoiding spicy or greasy foods for the first day.  Add more substantial food to your diet according to your physician's instructions.  Babies can be fed formula or breast milk as soon as they are hungry.  INCREASE FLUIDS AND FIBER TO AVOID CONSTIPATION.    SURGICAL DRESSING/BATHING: keep dressing in place for 24 hours, may shower and remove dressing tomorrow 9/23/2020 after 5pm    FOLLOW-UP APPOINTMENT:  A follow-up appointment should  be arranged with your doctor Andrea at [phone]; call to schedule.    You should CALL YOUR PHYSICIAN if you develop:  Fever greater than 101 degrees F.  Pain not relieved by medication, or persistent nausea or vomiting.  Excessive bleeding (blood soaking through dressing) or unexpected drainage from the wound.  Extreme redness or swelling around the incision site, drainage of pus or foul smelling drainage.  Inability to urinate or empty your bladder within 8 hours.  Problems with breathing or chest pain.    You should call 911 if you develop problems with breathing or chest pain.  If you are unable to contact your doctor or surgical center, you should go to the nearest emergency room or urgent care center.  Physician's telephone #: [phone]    If any questions arise, call your doctor.  If your doctor is not available, please feel free to call the Surgical Center at [phone].  The Center is open Monday through Friday from 7AM to 7PM.  You can also call the [employer] HOTLINE open 24 hours/day, 7 days/week and speak to a nurse at [phone], or toll free at [phone].    A registered nurse may call you a few days after your surgery to see how you are doing after your procedure.    MEDICATIONS: Resume taking daily medication.  Take prescribed pain medication with food.  If no medication is prescribed, you may take non-aspirin pain medication if needed.  PAIN MEDICATION CAN BE VERY CONSTIPATING.  Take a stool softener or laxative such as senokot, pericolace, or milk of magnesia if needed.    If your physician has prescribed pain medication that includes Acetaminophen (Tylenol), do not take additional Acetaminophen (Tylenol) while taking the prescribed medication.      Discharge Instructions per Kimi Denis M.D.    You were admitted with for pain control after your ablation.  You will be discharged home with pain medication (oxycodone).  Do NOT take this pain medication with alcohol or other  sedating drugs.  Do NOT drive or operate heavy machinery while taking this medication.  Narcotics can make you constipated so if taking please take with stool softner.      Return to ER if you develop worsening abdominal pain, chest pain, shortness of breath, fevers, lightheadedness/dizziness or bleeding or other concerning symptoms.                 Depression / Suicide Risk    As you are discharged from this [facility] facility, it is important to learn how to keep safe from harming yourself.    Recognize the warning signs:  · Abrupt changes in personality, positive or negative- including increase in energy   · Giving away possessions  · Change in eating patterns- significant weight changes-  positive or negative  · Change in sleeping patterns- unable to sleep or sleeping all the time   · Unwillingness or inability to communicate  · Depression  · Unusual sadness, discouragement and loneliness  · Talk of wanting to die  · Neglect of personal appearance   · Rebelliousness- reckless behavior  · Withdrawal from people/activities they love  · Confusion- inability to concentrate     If you or a loved one observes any of these behaviors or has concerns about self-harm, here's what you can do:  · Talk about it- your feelings and reasons for harming yourself  · Remove any means that you might use to hurt yourself (examples: pills, rope, extension cords, firearm)  · Get professional help from the community (Mental Health, Substance Abuse, psychological counseling)  · Do not be alone:Call your Safe Contact- someone whom you trust who will be there for you.  · Call your local CRISIS HOTLINE 280-4773 or 993-489-1629  · Call your local Children's Mobile Crisis Response Team [place] (920) 721-8031 or www.Omgili  · Call the toll free National Suicide Prevention Hotlines   · National Suicide Prevention Lifeline 679-482-YGEL (3184)  · National Hope Line Network 800-SUICIDE (804-0001)

## 2020-09-22 NOTE — ANESTHESIA PROCEDURE NOTES
Airway    Date/Time: 9/22/2020 2:54 PM  Performed by: Justyna Singh M.D.  Authorized by: Justyna Singh M.D.     Location:  OR  Urgency:  Elective  Indications for Airway Management:  Anesthesia      Spontaneous Ventilation: absent    Sedation Level:  Deep  Preoxygenated: Yes    Patient Position:  Sniffing  Final Airway Type:  Endotracheal airway  Final Endotracheal Airway:  ETT  Cuffed: Yes    Technique Used for Successful ETT Placement:  Direct laryngoscopy    Insertion Site:  Oral  Blade Type:  Ruiz  Laryngoscope Blade/Videolaryngoscope Blade Size:  3  ETT Size (mm):  7.0  Measured from:  Gums  ETT to Gums (cm):  22  Placement Verified by: auscultation and capnometry    Cormack-Lehane Classification:  Grade I - full view of glottis  Number of Attempts at Approach:  1  Ventilation Between Attempts:  BVM  Number of Other Approaches Attempted:  0

## 2020-09-22 NOTE — OR SURGEON
Immediate Post- Operative Note        PostOp Diagnosis: Multifocal HCC    Procedure(s): Microwave Ablation x 2    Estimated Blood Loss: Less than 5 ml        Complications: None            9/22/2020     4:11 PM     Reji Mendez M.D.

## 2020-09-22 NOTE — ANESTHESIA TIME REPORT
Anesthesia Start and Stop Event Times     Date Time Event    9/22/2020 02:34 PM Ready for Procedure    9/22/2020 02:46 PM Anesthesia Start        Responsible Staff  09/22/20    Name Role Begin End    Justyna Singh M.D. Anesthesiologist 09/22/20 02:46 PM Not recorded        Preop Diagnosis (Free Text):  Pre-op Diagnosis             Preop Diagnosis (Codes):    Post op Diagnosis  Hepatocellular carcinoma (HCC)      Premium Reason  A. 3PM - 7AM    Comments:

## 2020-09-22 NOTE — ANESTHESIA PREPROCEDURE EVALUATION
Relevant Problems      (+) Cirrhosis of liver without ascites (HCC)   (+) Hepatocellular carcinoma (HCC)      Other   (+) Congestive splenomegaly       Physical Exam    Airway   Mallampati: II  TM distance: >3 FB  Neck ROM: full       Cardiovascular - normal exam  Rhythm: regular  Rate: normal  (-) murmur  Comments: EKG NSR   Dental - normal exam  (+) upper dentures, lower dentures, implants           Pulmonary - normal exam  Breath sounds clear to auscultation     Abdominal    Neurological - normal exam                 Anesthesia Plan    ASA 3   ASA physical status 3 criteria: other (comment)    Plan - general       Airway plan will be ETT      Plan Factors:   Patient was previously instructed to abstain from smoking on day of procedure.  Patient did not smoke on day of procedure.      Induction: intravenous      Pertinent diagnostic labs and testing reviewed    Informed Consent:    Anesthetic plan and risks discussed with patient.    Use of blood products discussed with: patient whom consented to blood products.

## 2020-09-22 NOTE — ANESTHESIA POSTPROCEDURE EVALUATION
Patient: Precious Bullock    Procedure Summary     Date: 09/22/20 Room / Location: [facility] IMAGING - INTERVENTIONAL - [facility]    Anesthesia Start: 1446 Anesthesia Stop: 1625    Procedure: CT-RF LIVER TUMOR ABLATN PRCTN Diagnosis:       Hepatoma (HCC)      Liver cell carcinoma      (hepatomas)      (microwave ablation of 2 liver lesions)    Scheduled Providers: Reji Mendez M.D. Responsible Provider: Justyna Singh M.D.    Anesthesia Type: general ASA Status: 3          Final Anesthesia Type: general  Last vitals  BP   Blood Pressure : 145/63    Temp   36.9 °C (98.4 °F)    Pulse   Pulse: 77   Resp   18    SpO2   99 %      Anesthesia Post Evaluation    Patient location during evaluation: PACU  Patient participation: complete - patient participated  Level of consciousness: awake and alert  Pain score: 0    Airway patency: patent  Anesthetic complications: no  Cardiovascular status: hemodynamically stable  Respiratory status: acceptable  Hydration status: euvolemic    PONV: none           Nurse Pain Score: 0 (NPRS)

## 2020-09-22 NOTE — PROGRESS NOTES
IR Procedure Note:    Dr. Mendez and  Dr. Singh consented patient prior to procedure; all questions answered. IR Tech Jorge assisted    Site Marked and Confirmed with CT imaging by MD, RT and RN pre procedure.     Dr. Mendez performed Liver Ablation with general anesthesia by DR. Singh.   All vital signs monitoring and medication administration by anesthesia services. - See anesthesia record.     Gauze and tegaderm applied to Right lateral area , CDI and soft.  Report given to Jaz BRAMBILA.  RN transported pt to PPU with Dr. Singh

## 2020-09-23 VITALS
HEIGHT: 67 IN | SYSTOLIC BLOOD PRESSURE: 123 MMHG | OXYGEN SATURATION: 94 % | TEMPERATURE: 98.7 F | WEIGHT: 153.66 LBS | HEART RATE: 79 BPM | BODY MASS INDEX: 24.12 KG/M2 | RESPIRATION RATE: 17 BRPM | DIASTOLIC BLOOD PRESSURE: 53 MMHG

## 2020-09-23 PROCEDURE — 99217 PR OBSERVATION CARE DISCHARGE: CPT | Performed by: INTERNAL MEDICINE

## 2020-09-23 PROCEDURE — G0378 HOSPITAL OBSERVATION PER HR: HCPCS

## 2020-09-23 RX ORDER — ONDANSETRON 4 MG/1
4 TABLET, ORALLY DISINTEGRATING ORAL EVERY 4 HOURS PRN
Qty: 10 TAB | Refills: 0 | Status: SHIPPED | OUTPATIENT
Start: 2020-09-23

## 2020-09-23 ASSESSMENT — PAIN DESCRIPTION - PAIN TYPE
TYPE: SURGICAL PAIN
TYPE: SURGICAL PAIN

## 2020-09-23 NOTE — PROGRESS NOTES
2 RN Skin Check Complete    All skin was inspected including bony prominences and under medical devices.     Skin under nasal cannula intact  Bony prominences intact and blanching    Pt has surgical site on R lateral abdomen with dressing of gauze & tegaderm in place, CDI  Pt has small bruise to back of upper L arm, JULY    All other skin intact

## 2020-09-23 NOTE — OR NURSING
1623 Pt report received from IR nurse, pt brought over by RN and anesthesia on [facility]. Pt placed on monitor, VSS, family at bedside. Pts incision site is clean, dry and soft, no S/S of bleeding.     1645 pt is resting comfortably in bed no C/O pain, VSS, incision site is clean dry and soft.     1710 spoke to  regarding D/C instructions, gave Bradley,  pts narcotic prescription to fill at [facility] pharmacy while he waits for pts bedrest to be completed.     1800  Report given to accepting RN Vikki

## 2020-09-23 NOTE — PROGRESS NOTES
Pt care assumed at 07:12 with bedside report. Pt lying in bed, no distress noted, denies pain at this time, awake, alert, denies needs at this time, call light/personal items in reach, board updated. Discussed with pt possible discharge today. Pt ready to go home.

## 2020-09-23 NOTE — OR NURSING
1800 Care of patient assumed from Jaz BRAMBILA.  Patient c/o of nausea given fluids and oxygen.  States her pain is tolerable at this time.  1820 Patient reports a relief in nausea.  1845 Patient reports pain is now 6/10.  Patient is anxious to go home and feels she will be more comfortable at home.  1900 Dr. Mendez called to update on patient status.  Currently in procedure but will return call.  1908  updated on patient status.  1920 Dr. Mendez at bedside.  1930 Patient medicated per MAR for pain.  1945  at bedside.  Patient reports pain is now 4/10 and tolerable.  Site is clean, dry and soft.  2015 Dr. Barbour at bedside.  2055 Patient reports an improvement in pain.  Now 2 or 3.  2110 Report called to Latia BRAMBILA.  2135 Patient transferred to [facility] via [facility] with transport.

## 2020-09-24 NOTE — DISCHARGE SUMMARY
"Discharge Summary    CHIEF COMPLAINT ON ADMISSION  No chief complaint on file.      Reason for Admission  Liver cell carcinoma     Admission Date  9/22/2020    CODE STATUS  FULL    HPI & HOSPITAL COURSE    H&P per Dr. Barbour dated 9/22/20:    \"70 y.o. female who presented 9/22/2020 with scheduled liver ablation for HCC. Ms. Bullock has a past medical history of hepatocellular carcinoma with recent ablation by Dr. Robert on 8/10/2020 and is followed by Dr. Matias, oncology.  She also has a history of polycythemia vera and is scheduled to start treatment by Dr. Matias after this procedure.  She underwent ablation of 2 lesions by Dr. Mendez, interventional radiology today.  Postoperatively she was in severe pain and is required IV Dilaudid.  She states IV Dilaudid brought her pain from 6 out of 10 down to 4 out of 10 but she is in too much pain to go home.  I spoke with Dr. Mendez he is asked that she is admitted overnight for pain control.  Patient states she has not had exposure to COVID denies fevers and chills, no cough or shortness of breath, no body aches, no changes in her taste or smell.  A screening COVID was performed 9/17/2020 and was negative\"    Patient did well overnight with pain controlled.  On HD#2 she was pain-free and tolerating po.  Patient comfortable with dc home.  Has close follow up.         Therefore, she is discharged in good and stable condition to home with close outpatient follow-up.    The patient recovered much more quickly than anticipated on admission.    Discharge Date  9/23/2020    FOLLOW UP ITEMS POST DISCHARGE  GI/surgery follow up    DISCHARGE DIAGNOSES  Principal Problem:    Abdominal pain POA: Yes  Active Problems:    Hepatocellular carcinoma (HCC) POA: Yes    Cirrhosis of liver without ascites (HCC) POA: Yes    Essential hypertension POA: Yes    Polycythemia vera (HCC) POA: Yes  Resolved Problems:    * No resolved hospital problems. *      FOLLOW UP  No future " "appointments.        If symptoms worsen    Juan Manuel Savage M.D.  [facility] [id number] [phone]      As needed    Juan Manuel Savage M.D.  [facility] [id number] [phone]            MEDICATIONS ON DISCHARGE     Medication List      START taking these medications      Instructions   ondansetron 4 MG Tbdp  Commonly known as: ZOFRAN ODT   Take 1 Tab by mouth every four hours as needed for Nausea (give PO if IV route is unavailable.).  Dose: 4 mg     oxyCODONE immediate-release 5 MG Tabs  Commonly known as: ROXICODONE   Take 1 Tab by mouth every four hours as needed for Severe Pain for up to 7 days.  Dose: 5 mg        CONTINUE taking these medications      Instructions   amLODIPine 10 MG Tabs  Commonly known as: NORVASC   Take 10 mg by mouth every bedtime.  Dose: 10 mg     CALCIUM PO   Take 1 Tab by mouth every bedtime. Pt unsure of dose  Dose: 1 Tab     [employer] SENNA PO   Take 1 Tab by mouth as needed.  Dose: 1 Tab     omeprazole 40 MG delayed-release capsule  Commonly known as: PRILOSEC   Take 40 mg by mouth every bedtime.  Dose: 40 mg     promethazine 25 MG Tabs  Commonly known as: PHENERGAN   Take 0.5 Tabs by mouth every 6 hours as needed.  Dose: 12.5 mg     SM Aspirin Adult Low Strength 81 MG EC tablet  Generic drug: aspirin   Take 81 mg by mouth every bedtime.  Dose: 81 mg            Allergies  Allergies   Allergen Reactions   • Pcn [Penicillins] Swelling and Myalgia   • Sulfa Drugs Anaphylaxis and Swelling   • Levaquin      \"very Dizzy \"       DIET  No orders of the defined types were placed in this encounter.      ACTIVITY  As tolerated.  Weight bearing as tolerated    CONSULTATIONS  IR    PROCEDURES  9/22/20: Microwave Ablation x 2 for multifocal HCC with Dr. Mendez    Discharge Exam:  Physical Exam   Constitutional: She is oriented to person, place, and time and well-developed, well-nourished, and in no distress.   HENT:   Head: Normocephalic and atraumatic. "   Mouth/Throat: Oropharynx is clear and moist.   Eyes: Conjunctivae are normal. No scleral icterus.   Neck: Normal range of motion. Neck supple.   Cardiovascular: Normal rate and regular rhythm. Exam reveals no gallop and no friction rub.   Murmur heard.  Pulmonary/Chest: Effort normal and breath sounds normal.   Abdominal: Soft. Bowel sounds are normal. She exhibits no distension. There is no abdominal tenderness.   Musculoskeletal:         General: No edema.   Neurological: She is alert and oriented to person, place, and time.   Skin: Skin is warm and dry.   Psychiatric: Mood and affect normal.         LABORATORY  Lab Results   Component Value Date    SODIUM 142 08/10/2020    POTASSIUM 3.9 08/10/2020    CHLORIDE 106 08/10/2020    CO2 22 08/10/2020    GLUCOSE 101 (H) 08/10/2020    BUN 14 08/10/2020    CREATININE 0.48 (L) 08/10/2020        Lab Results   Component Value Date    WBC 6.1 08/10/2020    HEMOGLOBIN 14.8 08/10/2020    HEMATOCRIT 43.4 08/10/2020    PLATELETCT 378 08/10/2020        Total time of the discharge process < 30 minutes.

## 2020-10-07 DIAGNOSIS — C22.0 HEPATOCELLULAR CARCINOMA (HCC): ICD-10-CM

## 2020-11-23 ENCOUNTER — HOSPITAL ENCOUNTER (OUTPATIENT)
Dept: RADIOLOGY | Facility: MEDICAL CENTER | Age: 70
End: 2020-11-23
Payer: MEDICARE

## 2020-11-24 ENCOUNTER — HOSPITAL ENCOUNTER (OUTPATIENT)
Dept: RADIOLOGY | Facility: MEDICAL CENTER | Age: 70
End: 2020-11-24
Attending: NURSE PRACTITIONER
Payer: MEDICARE

## 2020-11-24 DIAGNOSIS — C22.0 HEPATOCELLULAR CARCINOMA (HCC): ICD-10-CM

## 2020-11-24 NOTE — PROGRESS NOTES
Telephone Appointment Visit   As a means of avoiding spread of COVID-19, this visit is being conducted by telephone. This telephone visit was initiated by the patient and they verbally consented. Reji Mendez MD present and participated in entirety of discussion.     Time at start of call: 1453  Reason for Call:  Lab Follow-up and Imaging follow up    HPI:     presented with polycythemia vera and was found to have cirrhosis and an incidental liver mass.  was ultimately referred to interventional radiology for evaluation and management with locoregional therapy.She has undergone the following interventions:  · 8/10/20 Laparoscopic ablation of segment 4A hepatoma, [facility] ([place])  · 9/22/20 CT guided ablation of segments 4A and 7 hepatomas, [facility] ([place])     Today, she states she has not quite recovered from her ablation procedure. She describes site pain that has not improved. She has not tried over the counter measures, but states it is constant and it is difficult to wear a bra due to the pain. She denies recent illness and fever. She is wintering in [place] but otherwise lives in [place]. She is establishing with a local medical oncologist in [place].      Labs / Images Reviewed:   CT abdomen 11/2/20 at [facility]:         [facility] Rad[facility]:  1. CT guided microwave ablation of 2 hepatocellular carcinomas in segments 4A and segment 7.  2.  Follow-up dual phase CT imaging (arterial and portal venous phases) should be performed in approximately 1 month to evaluate for residual disease.    CT abdomen on September 8, 2020 at [facility]:  1.  Ablation cavity within hepatic segment 8 measuring 3.5 x 2.6 cm without evidence for residual tumor  2.  Arterial phase enhancing mass within hepatic segment 4A measuring 2.3 x 1.6 cm washout and no development of pseudocapsule-LIRAD code 5  3.  Arterial phase enhancing 14 x 10 mm mass within hepatic segment 8 just  posterior to ablation cavity with washout and no development of pseudocapsule-LIRAD code 4 due to size less than 20 mm  4.  Cirrhosis with portal hypertension       CT Abdomen June 25, 2020 at [facility]:  Labs from [facility] 11/2/20 scanned into [employer] Tab and reviewed.   DCP 4.0  .2  AFP L3% 30.1    T bili 1.725 (prior 1.4)  INR 1.12  eGFR >60  Na 140  Albumin 3.57    WBC 4.2    HGB 15.7  HCT 46.7    DAYANNA score A2  CP Class A  Assessment and Plan:     Impression:   1. Unresectable LR-5 lesion segment 4A, 2.3 x 1.6 cm status post image guided ablation, with residual tumor.   2. Unresectable LR-4 lesion segment 8, 1.4 x 1.0 cm, status post image guided ablation, with good treatment effect.   3. LR 3 lesion segment 2, 1.3 cm.   4. Cirrhosis.  5. Portal hypertension.  6. Splenomegaly.     Follow-up: Reji Mendez MD has reviewed 's history and imaging studies and discussed treatment options.  is a candidate for repeat ablation of the residual tumor in 4A. If the other LR 3 lesion described in segment 2 of the report is visualized, we could potentially ablate both lesions in the same setting. Unfortunately, we only have post procedure tumor markers so we are not able to assess whether they improved after intervention. We discussed alternatives to the procedure including surveillance with no intervention at this time and repeating imaging in 3 months. Ms. Bullock is in [place] and hopeful she can establish local care in that region. We encouraged her to do so and also seek a referral to address the residual tumor with locoregional intervention. In terms of her site pain, we explained it is unusual to have pain 2 months post procedure but it is possible that there is nerve irritation from the tract. We recommended over the counter analgesics to start to try to improve her pain. All questions were answered.      Time at end of call: [id number]  Total Time Spent: 5-10 minutes    Bharti Child,  GENE

## (undated) DEVICE — GOWN WARMING STANDARD FLEX - (30/CA)

## (undated) DEVICE — MASK ANESTHESIA ADULT  - (100/CA)

## (undated) DEVICE — ELECTRODE DUAL RETURN W/ CORD - (50/PK)

## (undated) DEVICE — DRAPESURG STERI-DRAPE LONG - (10/BX 4BX/CA)

## (undated) DEVICE — TROCAR Z THREAD12MM OPTICAL - NON BLADED (6/BX)

## (undated) DEVICE — CHLORAPREP 26 ML APPLICATOR - ORANGE TINT(25/CA)

## (undated) DEVICE — NEPTUNE 4 PORT MANIFOLD - (20/PK)

## (undated) DEVICE — DRESSING TRANSPARENT FILM TEGADERM 2.375 X 2.75"  (100EA/BX)"

## (undated) DEVICE — PROTECTOR ULNA NERVE - (36PR/CA)

## (undated) DEVICE — STAPLER SKIN DISP - (6/BX 10BX/CA) VISISTAT

## (undated) DEVICE — HEAD HOLDER JUNIOR/ADULT

## (undated) DEVICE — SET SUCTION/IRRIGATION WITH DISPOSABLE TIP (6/CA )PART #0250-070-520 IS A SUB

## (undated) DEVICE — SODIUM CHL IRRIGATION 0.9% 1000ML (12EA/CA)

## (undated) DEVICE — TUBING CLEARLINK DUO-VENT - C-FLO (48EA/CA)

## (undated) DEVICE — SODIUM CHL. IRRIGATION 0.9% 3000ML (4EA/CA 65CA/PF)

## (undated) DEVICE — LACTATED RINGERS INJ 1000 ML - (14EA/CA 60CA/PF)

## (undated) DEVICE — GLOVE BIOGEL SZ 8 SURGICAL PF LTX - (50PR/BX 4BX/CA)

## (undated) DEVICE — CLOSURE SKIN STRIP 1/2 X 4 IN - (STERI STRIP) (50/BX 4BX/CA)

## (undated) DEVICE — SENSOR SPO2 NEO LNCS ADHESIVE (20/BX) SEE USER NOTES

## (undated) DEVICE — SUTURE 0 VICRYL PLUS UR-6 - 27 INCH (36/BX)

## (undated) DEVICE — DRAPE IOBAN II INCISE 23X17 - (10EA/BX 4BX/CA)

## (undated) DEVICE — SPONGE GAUZESTER 4 X 4 4PLY - (128PK/CA)

## (undated) DEVICE — CANISTER SUCTION 3000ML MECHANICAL FILTER AUTO SHUTOFF MEDI-VAC NONSTERILE LF DISP  (40EA/CA)

## (undated) DEVICE — SLEEVE, VASO, THIGH, MED

## (undated) DEVICE — GOWN SURGICAL X-LARGE ULTRA - FILM-REINFORCED (20/CA)

## (undated) DEVICE — SET EXTENSION WITH 2 PORTS (48EA/CA) ***PART #2C8610 IS A SUBSTITUTE*****

## (undated) DEVICE — KIT ANESTHESIA W/CIRCUIT & 3/LT BAG W/FILTER (20EA/CA)

## (undated) DEVICE — ELECTRODE 850 FOAM ADHESIVE - HYDROGEL RADIOTRNSPRNT (50/PK)

## (undated) DEVICE — SUTURE GENERAL

## (undated) DEVICE — TROCAR 5X100 NON BLADED Z-TH - READ KII (6/BX)

## (undated) DEVICE — PROBE SOLERO MICROWAVE APPLICATOR 29CM

## (undated) DEVICE — SUCTION INSTRUMENT YANKAUER BULBOUS TIP W/O VENT (50EA/CA)

## (undated) DEVICE — TUBE CONNECT SUCTION CLEAR 120 X 1/4" (50EA/CA)"

## (undated) DEVICE — PACK LAP CHOLE OR - (2EA/CA)

## (undated) DEVICE — SET LEADWIRE 5 LEAD BEDSIDE DISPOSABLE ECG (1SET OF 5/EA)